# Patient Record
Sex: FEMALE | Race: WHITE | Employment: FULL TIME | URBAN - METROPOLITAN AREA
[De-identification: names, ages, dates, MRNs, and addresses within clinical notes are randomized per-mention and may not be internally consistent; named-entity substitution may affect disease eponyms.]

---

## 2017-03-09 ENCOUNTER — TELEPHONE (OUTPATIENT)
Dept: FAMILY MEDICINE CLINIC | Age: 32
End: 2017-03-09

## 2022-10-09 ENCOUNTER — HOSPITAL ENCOUNTER (EMERGENCY)
Age: 37
Discharge: HOME | End: 2022-10-09
Payer: COMMERCIAL

## 2022-10-09 VITALS
TEMPERATURE: 98.5 F | BODY MASS INDEX: 42.35 KG/M2 | RESPIRATION RATE: 17 BRPM | OXYGEN SATURATION: 100 % | SYSTOLIC BLOOD PRESSURE: 117 MMHG | HEIGHT: 63 IN | WEIGHT: 239 LBS | DIASTOLIC BLOOD PRESSURE: 77 MMHG | HEART RATE: 84 BPM

## 2022-10-09 DIAGNOSIS — R73.9 HYPERGLYCEMIA: ICD-10-CM

## 2022-10-09 DIAGNOSIS — E11.9 NEW ONSET TYPE 2 DIABETES MELLITUS (HCC): Primary | ICD-10-CM

## 2022-10-09 LAB
A/G RATIO: 1.5 (ref 1.1–2.2)
ALBUMIN SERPL-MCNC: 4.8 G/DL (ref 3.4–5)
ALP BLD-CCNC: 121 U/L (ref 40–129)
ALT SERPL-CCNC: 124 U/L (ref 10–40)
ANION GAP SERPL CALCULATED.3IONS-SCNC: 16 MMOL/L (ref 3–16)
AST SERPL-CCNC: 159 U/L (ref 15–37)
BACTERIA: ABNORMAL /HPF
BASE EXCESS VENOUS: -2.4 MMOL/L (ref -3–3)
BASOPHILS ABSOLUTE: 0.1 K/UL (ref 0–0.2)
BASOPHILS RELATIVE PERCENT: 1.1 %
BILIRUB SERPL-MCNC: 0.9 MG/DL (ref 0–1)
BILIRUBIN URINE: NEGATIVE
BLOOD, URINE: ABNORMAL
BUN BLDV-MCNC: 12 MG/DL (ref 7–20)
CALCIUM SERPL-MCNC: 9.8 MG/DL (ref 8.3–10.6)
CARBOXYHEMOGLOBIN: 1.9 % (ref 0–1.5)
CHLORIDE BLD-SCNC: 90 MMOL/L (ref 99–110)
CHP ED QC CHECK: YES
CHP ED QC CHECK: YES
CLARITY: ABNORMAL
CO2: 23 MMOL/L (ref 21–32)
COLOR: YELLOW
CREAT SERPL-MCNC: 0.8 MG/DL (ref 0.6–1.1)
EOSINOPHILS ABSOLUTE: 0.2 K/UL (ref 0–0.6)
EOSINOPHILS RELATIVE PERCENT: 3 %
EPITHELIAL CELLS, UA: ABNORMAL /HPF (ref 0–5)
GFR AFRICAN AMERICAN: >60
GFR NON-AFRICAN AMERICAN: >60
GLUCOSE BLD-MCNC: 277 MG/DL (ref 70–99)
GLUCOSE BLD-MCNC: 352 MG/DL
GLUCOSE BLD-MCNC: 352 MG/DL (ref 70–99)
GLUCOSE BLD-MCNC: 352 MG/DL (ref 70–99)
GLUCOSE BLD-MCNC: 422 MG/DL (ref 70–99)
GLUCOSE BLD-MCNC: 456 MG/DL
GLUCOSE BLD-MCNC: 456 MG/DL (ref 70–99)
GLUCOSE URINE: >=1000 MG/DL
HCG QUALITATIVE: NEGATIVE
HCO3 VENOUS: 23.4 MMOL/L (ref 23–29)
HCT VFR BLD CALC: 41.8 % (ref 36–48)
HEMOGLOBIN: 14.3 G/DL (ref 12–16)
KETONES, URINE: 40 MG/DL
LACTIC ACID: 3.4 MMOL/L (ref 0.4–2)
LEUKOCYTE ESTERASE, URINE: NEGATIVE
LYMPHOCYTES ABSOLUTE: 2.5 K/UL (ref 1–5.1)
LYMPHOCYTES RELATIVE PERCENT: 43.1 %
MCH RBC QN AUTO: 28.4 PG (ref 26–34)
MCHC RBC AUTO-ENTMCNC: 34.1 G/DL (ref 31–36)
MCV RBC AUTO: 83.2 FL (ref 80–100)
METHEMOGLOBIN VENOUS: 0.5 %
MICROSCOPIC EXAMINATION: YES
MONOCYTES ABSOLUTE: 0.3 K/UL (ref 0–1.3)
MONOCYTES RELATIVE PERCENT: 4.5 %
NEUTROPHILS ABSOLUTE: 2.8 K/UL (ref 1.7–7.7)
NEUTROPHILS RELATIVE PERCENT: 48.3 %
NITRITE, URINE: NEGATIVE
O2 SAT, VEN: 64 %
O2 THERAPY: ABNORMAL
PCO2, VEN: 44.2 MMHG (ref 40–50)
PDW BLD-RTO: 13.4 % (ref 12.4–15.4)
PERFORMED ON: ABNORMAL
PH UA: 6 (ref 5–8)
PH VENOUS: 7.34 (ref 7.35–7.45)
PLATELET # BLD: 241 K/UL (ref 135–450)
PMV BLD AUTO: 7.8 FL (ref 5–10.5)
PO2, VEN: 32.3 MMHG (ref 25–40)
POTASSIUM REFLEX MAGNESIUM: 3.6 MMOL/L (ref 3.5–5.1)
PROTEIN UA: ABNORMAL MG/DL
RBC # BLD: 5.02 M/UL (ref 4–5.2)
RBC UA: ABNORMAL /HPF (ref 0–4)
SODIUM BLD-SCNC: 129 MMOL/L (ref 136–145)
SPECIFIC GRAVITY UA: 1.02 (ref 1–1.03)
SPECIMEN STATUS: NORMAL
TCO2 CALC VENOUS: 25 MMOL/L
TOTAL PROTEIN: 8 G/DL (ref 6.4–8.2)
TROPONIN: <0.01 NG/ML
URINE REFLEX TO CULTURE: YES
URINE TYPE: ABNORMAL
UROBILINOGEN, URINE: 0.2 E.U./DL
WBC # BLD: 5.9 K/UL (ref 4–11)
WBC UA: ABNORMAL /HPF (ref 0–5)

## 2022-10-09 PROCEDURE — 84703 CHORIONIC GONADOTROPIN ASSAY: CPT

## 2022-10-09 PROCEDURE — 99284 EMERGENCY DEPT VISIT MOD MDM: CPT

## 2022-10-09 PROCEDURE — 93005 ELECTROCARDIOGRAM TRACING: CPT | Performed by: PHYSICIAN ASSISTANT

## 2022-10-09 PROCEDURE — 84484 ASSAY OF TROPONIN QUANT: CPT

## 2022-10-09 PROCEDURE — 81001 URINALYSIS AUTO W/SCOPE: CPT

## 2022-10-09 PROCEDURE — 87086 URINE CULTURE/COLONY COUNT: CPT

## 2022-10-09 PROCEDURE — 82803 BLOOD GASES ANY COMBINATION: CPT

## 2022-10-09 PROCEDURE — 2580000003 HC RX 258: Performed by: PHYSICIAN ASSISTANT

## 2022-10-09 PROCEDURE — 80053 COMPREHEN METABOLIC PANEL: CPT

## 2022-10-09 PROCEDURE — 85025 COMPLETE CBC W/AUTO DIFF WBC: CPT

## 2022-10-09 PROCEDURE — 83605 ASSAY OF LACTIC ACID: CPT

## 2022-10-09 PROCEDURE — 96376 TX/PRO/DX INJ SAME DRUG ADON: CPT

## 2022-10-09 PROCEDURE — 96374 THER/PROPH/DIAG INJ IV PUSH: CPT

## 2022-10-09 PROCEDURE — 6370000000 HC RX 637 (ALT 250 FOR IP): Performed by: PHYSICIAN ASSISTANT

## 2022-10-09 RX ORDER — SODIUM CHLORIDE, SODIUM LACTATE, POTASSIUM CHLORIDE, AND CALCIUM CHLORIDE .6; .31; .03; .02 G/100ML; G/100ML; G/100ML; G/100ML
2000 INJECTION, SOLUTION INTRAVENOUS ONCE
Status: COMPLETED | OUTPATIENT
Start: 2022-10-09 | End: 2022-10-09

## 2022-10-09 RX ORDER — 0.9 % SODIUM CHLORIDE 0.9 %
1000 INTRAVENOUS SOLUTION INTRAVENOUS ONCE
Status: COMPLETED | OUTPATIENT
Start: 2022-10-09 | End: 2022-10-09

## 2022-10-09 RX ADMIN — INSULIN HUMAN 5 UNITS: 100 INJECTION, SOLUTION PARENTERAL at 17:29

## 2022-10-09 RX ADMIN — SODIUM CHLORIDE, POTASSIUM CHLORIDE, SODIUM LACTATE AND CALCIUM CHLORIDE 2000 ML: 600; 310; 30; 20 INJECTION, SOLUTION INTRAVENOUS at 15:36

## 2022-10-09 RX ADMIN — SODIUM CHLORIDE 1000 ML: 9 INJECTION, SOLUTION INTRAVENOUS at 18:37

## 2022-10-09 RX ADMIN — INSULIN HUMAN 5 UNITS: 100 INJECTION, SOLUTION PARENTERAL at 18:36

## 2022-10-09 ASSESSMENT — ENCOUNTER SYMPTOMS
EYE DISCHARGE: 0
EYE PAIN: 0
PHOTOPHOBIA: 0
EYE ITCHING: 0
EYE REDNESS: 0

## 2022-10-09 ASSESSMENT — PAIN - FUNCTIONAL ASSESSMENT: PAIN_FUNCTIONAL_ASSESSMENT: NONE - DENIES PAIN

## 2022-10-09 NOTE — ED NOTES
Patient in bed resting upon room entry for hourly rounding. Patient repositioned for comfort: pulled up in bed, pillows readjusted around patient, and blankets straightened. No acute signs or symptoms of distress noted at this time. Respiratory status stable and VS WDL. Patient denies any needs, concerns, or complaints at this time. Call light within reach.      Luther Wilson RN  10/09/22 7089

## 2022-10-09 NOTE — ED PROVIDER NOTES
[facility]  ED  EMERGENCY DEPARTMENT ENCOUNTER        Pt Name: Rush Ballard  MRN: [id number]  [place] 1985  Date of evaluation: 10/9/2022  Provider: Mike Quesada PA-C  PCP: No primary care provider on file. Note Started: 3:48 PM EDT       CRYS. I have evaluated this patient. My supervising physician was available for consultation. CHIEF COMPLAINT       Chief Complaint   Patient presents with    Hyperglycemia     Pt to ED with c/o hyperglycemia in the 400s. Pt is not diabetic. Pt was seen at urgent care for symptoms including muscle aches, fatigue, blurry vision. Pt sent here from urgent care. HISTORY OF PRESENT ILLNESS   (Location, Timing/Onset, Context/Setting, Quality, Duration, Modifying Factors, Severity, Associated Signs and Symptoms)  Note limiting factors. Chief Complaint: Hyperglycemia, symptoms of diabetes    Rush Ballard is a 44 y.o. female who presents complaining planing of blood glucose of 400 today. Patient reports she has had polyuria, polydipsia for about a week, had blurred vision for 4 days since. She had her blood sugar checked out a little clinic today, sugar was over 400, directed to the ER. Denies prior history of diabetes, gestational diabetes, known pregnancy. Blood pressure elevated here, patient states anxious about elevated blood sugar, patient does not take any medication for hypertension or diabetes, daily medications at all. Nursing Notes were all reviewed and agreed with or any disagreements were addressed in the HPI. REVIEW OF SYSTEMS    (2-9 systems for level 4, 10 or more for level 5)     Review of Systems   Eyes:  Positive for visual disturbance. Negative for photophobia, pain, discharge, redness and itching. Neurological:  Positive for numbness (left 4th/5th digits since this am). Negative for dizziness, weakness and headaches. All other systems reviewed and are negative.     Positives and Pertinent negatives as per HPI. Except as noted above in the ROS, all other systems were reviewed and negative. PAST MEDICAL HISTORY   History reviewed. No pertinent past medical history. SURGICAL HISTORY     Past Surgical History:   Procedure Laterality Date     SECTION      X 2         CURRENTMEDICATIONS       Previous Medications    JUNEL FE 1/20 1-20 MG-MCG PER TABLET    Take 1 tablet by mouth daily         ALLERGIES     Patient has no known allergies. FAMILYHISTORY       Family History   Problem Relation Age of Onset    Cancer Mother         breast    Cancer Maternal Uncle         brain    Cancer Maternal Grandmother         lymphoma          SOCIAL HISTORY       Social History     Tobacco Use    Smoking status: Former     Types: Cigarettes     Quit date: 2013     Years since quittin.7    Smokeless tobacco: Never   Substance Use Topics    Alcohol use: Yes     Comment: rare    Drug use: No       SCREENINGS    Lg Coma Scale  Eye Opening: Spontaneous  Best Verbal Response: Oriented  Best Motor Response: Obeys commands  Jones Coma Scale Score: 15        PHYSICAL EXAM    (up to 7 for level 4, 8 or more for level 5)     ED Triage Vitals [10/09/22 1453]   BP Temp Temp Source Heart Rate Resp SpO2 Height Weight   (!) 169/113 98.5 °F (36.9 °C) Oral 98 18 98 % 5' 3\" (1.6 m) 239 lb (108.4 kg)       Physical Exam  Vitals and nursing note reviewed. Constitutional:       General: She is not in acute distress. Appearance: She is not ill-appearing or toxic-appearing. HENT:      Head: Normocephalic and atraumatic. Right Ear: External ear normal.      Left Ear: External ear normal.      Nose: Nose normal.      Mouth/Throat:      Mouth: Mucous membranes are moist.      Pharynx: Oropharynx is clear. Eyes:      Extraocular Movements: Extraocular movements intact. Conjunctiva/sclera: Conjunctivae normal.      Pupils: Pupils are equal, round, and reactive to light.    Cardiovascular: Rate and Rhythm: Normal rate and regular rhythm. Pulses: Normal pulses. Heart sounds: Normal heart sounds. Pulmonary:      Effort: Pulmonary effort is normal. No respiratory distress. Breath sounds: Normal breath sounds. Abdominal:      General: Abdomen is flat. Bowel sounds are normal. There is no distension. Palpations: Abdomen is soft. Tenderness: There is no abdominal tenderness. There is no guarding or rebound. Musculoskeletal:         General: Normal range of motion. Cervical back: Normal range of motion and neck supple. Skin:     General: Skin is warm and dry. Capillary Refill: Capillary refill takes less than 2 seconds. Neurological:      General: No focal deficit present. Mental Status: She is alert and oriented to person, place, and time. Cranial Nerves: No cranial nerve deficit. Sensory: Sensory deficit present. Motor: No weakness (decreased light touch in ulnar dermatome on left. no weakness).       Coordination: Coordination normal.      Gait: Gait normal.   Psychiatric:         Mood and Affect: Mood normal.         Behavior: Behavior normal.       DIAGNOSTIC RESULTS   LABS:    Labs Reviewed   COMPREHENSIVE METABOLIC PANEL W/ REFLEX TO MG FOR LOW K - Abnormal; Notable for the following components:       Result Value    Sodium 129 (*)     Chloride 90 (*)     Glucose 422 (*)      (*)      (*)     All other components within normal limits   BLOOD GAS, VENOUS - Abnormal; Notable for the following components:    pH, Job 7.342 (*)     Carboxyhemoglobin 1.9 (*)     All other components within normal limits   LACTIC ACID - Abnormal; Notable for the following components:    Lactic Acid 3.4 (*)     All other components within normal limits   URINALYSIS WITH REFLEX TO CULTURE - Abnormal; Notable for the following components:    Clarity, UA SL CLOUDY (*)     Glucose, Ur >=1000 (*)     Ketones, Urine 40 (*)     Blood, Urine LARGE (*) Protein, UA TRACE (*)     All other components within normal limits   MICROSCOPIC URINALYSIS - Abnormal; Notable for the following components:    WBC, UA 10-20 (*)     RBC, UA 5-10 (*)     Epithelial Cells, UA 21-50 (*)     Bacteria, UA 3+ (*)     All other components within normal limits   POCT GLUCOSE - Abnormal; Notable for the following components:    POC Glucose 456 (*)     All other components within normal limits   POCT GLUCOSE - Abnormal; Notable for the following components:    POC Glucose 352 (*)     All other components within normal limits   POCT GLUCOSE - Abnormal; Notable for the following components:    POC Glucose 352 (*)     All other components within normal limits   POCT GLUCOSE - Abnormal; Notable for the following components:    POC Glucose 277 (*)     All other components within normal limits   POCT GLUCOSE - Normal   POCT GLUCOSE - Normal   CULTURE, URINE   CBC WITH AUTO DIFFERENTIAL   SAMPLE POSSIBLE BLOOD BANK TESTING   HCG, SERUM, QUALITATIVE   TROPONIN       When ordered only abnormal lab results are displayed. All other labs were within normal range or not returned as of this dictation. EKG: When ordered, EKG's are interpreted by the Emergency Department Physician in the absence of a cardiologist.  Please see their note for interpretation of EKG. RADIOLOGY:   Non-plain film images such as CT, Ultrasound and MRI are read by the radiologist. Plain radiographic images are visualized and preliminarily interpreted by the ED Provider with the below findings:        Interpretation per the Radiologist below, if available at the time of this note:    No orders to display     No results found.         PROCEDURES   Unless otherwise noted below, none     Procedures    CRITICAL CARE TIME       CONSULTS:  None      EMERGENCY DEPARTMENT COURSE and DIFFERENTIAL DIAGNOSIS/MDM:   Vitals:    Vitals:    10/09/22 1737 10/09/22 1808 10/09/22 1837 10/09/22 1907   BP: 130/75 121/76 116/82 117/77   Pulse: 85 84 82 84   Resp: 14 14 17 17   Temp:       TempSrc:       SpO2: 99% 99% 99% 100%   Weight:       Height:           Patient was given the following medications:  Medications   lactated ringers bolus (0 mLs IntraVENous Stopped 10/9/22 1720)   insulin regular (HUMULIN R;NOVOLIN R) injection 5 Units (5 Units IntraVENous Given 10/9/22 1729)   0.9 % sodium chloride bolus (0 mLs IntraVENous Stopped 10/9/22 1937)   insulin regular (HUMULIN R;NOVOLIN R) injection 5 Units (5 Units IntraVENous Given 10/9/22 1836)         Is this patient to be included in the SEP-1 Core Measure due to severe sepsis or septic shock? No   Exclusion criteria - the patient is NOT to be included for SEP-1 Core Measure due to: Infection is not suspected    Briefly, patient with new onset diabetes, hyperglycemia without DKA or dehydration. She has pseudohyponatremia natremia, given 3 L of fluid and small amount of insulin here and blood glucose is trending down. Instructed to watch carbohydrate intake, take metformin as prescribed, get PCP and follow-up, return for any new or worsening symptoms    FINAL IMPRESSION      1. New onset type 2 diabetes mellitus ([facility] [address]    2. Hyperglycemia          DISPOSITION/PLAN   DISPOSITION Decision To Discharge 10/09/2022 07:35:49 PM      PATIENT REFERRED TO:  Alexandrea Shrestha MD  [address] [place] [address]  [phone]    In 2 days  Primary care follow-up. Return for any new or worsening symptoms. DISCHARGE MEDICATIONS:  New Prescriptions    METFORMIN (GLUCOPHAGE) 500 MG TABLET    Take 1 tablet by mouth 2 times daily (with meals)       DISCONTINUED MEDICATIONS:  Discontinued Medications    No medications on file              (Please note that portions of this note were completed with a voice recognition program.  Efforts were made to edit the dictations but occasionally words are mis-transcribed. )    Brennen Bae PA-C (electronically signed)            Brennen Bae SIERRA  10/09/22 1940

## 2022-10-09 NOTE — ED NOTES
Patient in bed resting upon room entry for hourly rounding. Patient repositioned for comfort: pulled up in bed, pillows readjusted around patient, and blankets straightened. No acute signs or symptoms of distress noted at this time. Respiratory status stable and VS WDL. Patient denies any needs, concerns, or complaints at this time. Call light within reach.      Fani Spann RN  10/09/22 1827

## 2022-10-09 NOTE — ED NOTES
Discharge instructions explained by ED provider. Patient verbalized understanding and denies any other concerns or complaints at this time. Patient vital signs stable and no acute signs or symptoms of distress noted at discharge. Patient deemed clinically stable. Patient d/c home with family.      Karina Hackett RN  10/09/22 7219

## 2022-10-09 NOTE — ED NOTES
Patient in bed resting upon room entry for hourly rounding. Patient repositioned for comfort: pulled up in bed, pillows readjusted around patient, and blankets straightened. No acute signs or symptoms of distress noted at this time. Respiratory status stable and VS WDL. Patient denies any needs, concerns, or complaints at this time. Call light within reach.      Gigi Arnold RN  10/09/22 5381

## 2022-10-09 NOTE — ED PROVIDER NOTES
I did not personally evaluate this patient but I was asked to review the EKG. EKG  The Ekg interpreted by myself in the emergency department in the absence of a cardiologist.  normal sinus rhythm with a rate of 81  Axis is   Normal  QTc is  within an acceptable range  Intervals and Durations are unremarkable. No specific ST-T wave changes appreciated. Nonspecific changes 2 3 aVF, unchanged with repeat EKG done 30 minutes apart  No evidence of acute ischemia.    No significant change from prior EKG dated June 7, 2012     Hudson Oviedo MD  10/09/22 1288

## 2022-10-10 LAB
EKG ATRIAL RATE: 84 BPM
EKG DIAGNOSIS: NORMAL
EKG P AXIS: 57 DEGREES
EKG P-R INTERVAL: 146 MS
EKG Q-T INTERVAL: 372 MS
EKG QRS DURATION: 78 MS
EKG QTC CALCULATION (BAZETT): 439 MS
EKG R AXIS: 14 DEGREES
EKG T AXIS: 46 DEGREES
EKG VENTRICULAR RATE: 84 BPM
URINE CULTURE, ROUTINE: NORMAL

## 2022-10-13 ENCOUNTER — HOSPITAL ENCOUNTER (OUTPATIENT)
Age: 37
Discharge: HOME | End: 2022-10-13
Payer: COMMERCIAL

## 2022-10-13 ENCOUNTER — OFFICE VISIT (OUTPATIENT)
Dept: PRIMARY CARE CLINIC | Age: 37
End: 2022-10-13
Payer: COMMERCIAL

## 2022-10-13 VITALS
HEART RATE: 99 BPM | TEMPERATURE: 97.2 F | WEIGHT: 236.2 LBS | BODY MASS INDEX: 41.85 KG/M2 | OXYGEN SATURATION: 98 % | HEIGHT: 63 IN

## 2022-10-13 DIAGNOSIS — E11.9 TYPE 2 DIABETES MELLITUS WITHOUT COMPLICATION, WITHOUT LONG-TERM CURRENT USE OF INSULIN (HCC): Primary | ICD-10-CM

## 2022-10-13 DIAGNOSIS — N92.0 MENORRHAGIA WITH REGULAR CYCLE: ICD-10-CM

## 2022-10-13 DIAGNOSIS — R06.83 SNORING: ICD-10-CM

## 2022-10-13 DIAGNOSIS — Z11.4 SCREENING FOR HIV WITHOUT PRESENCE OF RISK FACTORS: ICD-10-CM

## 2022-10-13 DIAGNOSIS — R31.9 HEMATURIA, UNSPECIFIED TYPE: ICD-10-CM

## 2022-10-13 DIAGNOSIS — Z00.00 HEALTHCARE MAINTENANCE: ICD-10-CM

## 2022-10-13 DIAGNOSIS — Z11.59 NEED FOR HEPATITIS C SCREENING TEST: ICD-10-CM

## 2022-10-13 DIAGNOSIS — E11.9 TYPE 2 DIABETES MELLITUS WITHOUT COMPLICATION, WITHOUT LONG-TERM CURRENT USE OF INSULIN (HCC): ICD-10-CM

## 2022-10-13 DIAGNOSIS — R74.01 TRANSAMINITIS: ICD-10-CM

## 2022-10-13 LAB
CHOLESTEROL, TOTAL: 214 MG/DL (ref 0–199)
CREATININE URINE: 166.6 MG/DL (ref 28–259)
HDLC SERPL-MCNC: 24 MG/DL (ref 40–60)
HEPATITIS C ANTIBODY INTERPRETATION: NORMAL
LDL CHOLESTEROL CALCULATED: ABNORMAL MG/DL
LDL CHOLESTEROL DIRECT: 99 MG/DL
MICROALBUMIN UR-MCNC: 5 MG/DL
MICROALBUMIN/CREAT UR-RTO: 30 MG/G (ref 0–30)
TRIGL SERPL-MCNC: 597 MG/DL (ref 0–150)
VLDLC SERPL CALC-MCNC: ABNORMAL MG/DL

## 2022-10-13 PROCEDURE — 99204 OFFICE O/P NEW MOD 45 MIN: CPT | Performed by: STUDENT IN AN ORGANIZED HEALTH CARE EDUCATION/TRAINING PROGRAM

## 2022-10-13 PROCEDURE — 86702 HIV-2 ANTIBODY: CPT

## 2022-10-13 PROCEDURE — 36415 COLL VENOUS BLD VENIPUNCTURE: CPT

## 2022-10-13 PROCEDURE — 82043 UR ALBUMIN QUANTITATIVE: CPT

## 2022-10-13 PROCEDURE — 83036 HEMOGLOBIN GLYCOSYLATED A1C: CPT

## 2022-10-13 PROCEDURE — 87390 HIV-1 AG IA: CPT

## 2022-10-13 PROCEDURE — 86803 HEPATITIS C AB TEST: CPT

## 2022-10-13 PROCEDURE — 82570 ASSAY OF URINE CREATININE: CPT

## 2022-10-13 PROCEDURE — 83721 ASSAY OF BLOOD LIPOPROTEIN: CPT

## 2022-10-13 PROCEDURE — 80061 LIPID PANEL: CPT

## 2022-10-13 PROCEDURE — 86787 VARICELLA-ZOSTER ANTIBODY: CPT

## 2022-10-13 PROCEDURE — 86701 HIV-1ANTIBODY: CPT

## 2022-10-13 RX ORDER — METFORMIN HYDROCHLORIDE 500 MG/1
1000 TABLET, EXTENDED RELEASE ORAL
Qty: 180 TABLET | Refills: 0 | Status: SHIPPED | OUTPATIENT
Start: 2022-10-13 | End: 2023-01-11

## 2022-10-13 SDOH — ECONOMIC STABILITY: FOOD INSECURITY: WITHIN THE PAST 12 MONTHS, THE FOOD YOU BOUGHT JUST DIDN'T LAST AND YOU DIDN'T HAVE MONEY TO GET MORE.: NEVER TRUE

## 2022-10-13 SDOH — ECONOMIC STABILITY: FOOD INSECURITY: WITHIN THE PAST 12 MONTHS, YOU WORRIED THAT YOUR FOOD WOULD RUN OUT BEFORE YOU GOT MONEY TO BUY MORE.: NEVER TRUE

## 2022-10-13 ASSESSMENT — PATIENT HEALTH QUESTIONNAIRE - PHQ9
3. TROUBLE FALLING OR STAYING ASLEEP: 1
SUM OF ALL RESPONSES TO PHQ QUESTIONS 1-9: 10
SUM OF ALL RESPONSES TO PHQ QUESTIONS 1-9: 10
9. THOUGHTS THAT YOU WOULD BE BETTER OFF DEAD, OR OF HURTING YOURSELF: 0
SUM OF ALL RESPONSES TO PHQ9 QUESTIONS 1 & 2: 2
5. POOR APPETITE OR OVEREATING: 2
6. FEELING BAD ABOUT YOURSELF - OR THAT YOU ARE A FAILURE OR HAVE LET YOURSELF OR YOUR FAMILY DOWN: 1
1. LITTLE INTEREST OR PLEASURE IN DOING THINGS: 1
7. TROUBLE CONCENTRATING ON THINGS, SUCH AS READING THE NEWSPAPER OR WATCHING TELEVISION: 3
2. FEELING DOWN, DEPRESSED OR HOPELESS: 1
10. IF YOU CHECKED OFF ANY PROBLEMS, HOW DIFFICULT HAVE THESE PROBLEMS MADE IT FOR YOU TO DO YOUR WORK, TAKE CARE OF THINGS AT HOME, OR GET ALONG WITH OTHER PEOPLE: 1
SUM OF ALL RESPONSES TO PHQ QUESTIONS 1-9: 10
SUM OF ALL RESPONSES TO PHQ QUESTIONS 1-9: 10
4. FEELING TIRED OR HAVING LITTLE ENERGY: 1

## 2022-10-13 ASSESSMENT — ENCOUNTER SYMPTOMS
DIARRHEA: 0
SHORTNESS OF BREATH: 0
VOMITING: 0
NAUSEA: 0
EYE PAIN: 0

## 2022-10-13 ASSESSMENT — ANXIETY QUESTIONNAIRES
5. BEING SO RESTLESS THAT IT IS HARD TO SIT STILL: 1
GAD7 TOTAL SCORE: 8
7. FEELING AFRAID AS IF SOMETHING AWFUL MIGHT HAPPEN: 0
1. FEELING NERVOUS, ANXIOUS, OR ON EDGE: 1
4. TROUBLE RELAXING: 3
3. WORRYING TOO MUCH ABOUT DIFFERENT THINGS: 1
6. BECOMING EASILY ANNOYED OR IRRITABLE: 1
2. NOT BEING ABLE TO STOP OR CONTROL WORRYING: 1
IF YOU CHECKED OFF ANY PROBLEMS ON THIS QUESTIONNAIRE, HOW DIFFICULT HAVE THESE PROBLEMS MADE IT FOR YOU TO DO YOUR WORK, TAKE CARE OF THINGS AT HOME, OR GET ALONG WITH OTHER PEOPLE: SOMEWHAT DIFFICULT

## 2022-10-13 ASSESSMENT — SOCIAL DETERMINANTS OF HEALTH (SDOH): HOW HARD IS IT FOR YOU TO PAY FOR THE VERY BASICS LIKE FOOD, HOUSING, MEDICAL CARE, AND HEATING?: NOT HARD AT ALL

## 2022-10-13 NOTE — PROGRESS NOTES
[address],  [place] [address]        Phone: [phone]    The following is written by a medical student, please see below for resident/attending attestation and plan. Name:  Rush Ballard  :    1985    Consultants:   Patient Care Team:  Maribell Echeverria DO as PCP - General (Family Medicine)    Chief Complaint:     Rush Ballard is a 44 y.o.  female who presents today for an established patient care visit with Personalized Prevention Plan Services as noted below. HPI:     Clay Ritchie 44 y.o. female has Chronic fatigue; Social anxiety disorder; Adult acne; Type 2 diabetes mellitus without complication, without long-term current use of insulin ([facility] [address] and Transaminitis on their problem list.  She presents due to new diagnosis of diabetes, ed visit. New diabetes     She went to [place] [id number] last week and began to have some blurry vision that also progressed to numbness on the left fourth and fifth digits. She also had muscle aches and polyuria and, when tested, had a blood glucose of 442. She went to [facility] and blood glucose was similar. Given insulin and fluid, and was prescribed metformin 500 mg BID. She began taking metformin and had diarrhea - on Tuesday she had six bowel movements. Her diarrhea is improving. Her nausea is resolved. Her hand numbness and tingling has resolved. She states that she is making lifestyle changes including having a more consistent eating pattern. Positive polydipsia, polyuria, and polyphagia prior to ED encounter, all of which have resolved. Anxiety and depression    Since April of this year she has not been as active as before. Her sleep has worsened and now she only gets 3-4 hours per night.   She has not tried anything for sleep in the past. She says she does not have good daily routines. She notes fewer interests and less interest in her hobbies. She notes feeling more sad than usual, has guilt over her marital situation and whether she is a good mother to her children. She notes trouble concentrating at work. Parents live near her but she has trouble asking for help. She denies having many friends. She wants to avoid pharmacotherapy at this time and would rather seek therapy. She denies symptoms of alphonso. VIKTORIYA 7 SCORE 10/13/2022   VIKTORIYA-7 Total Score 8     Interpretation of VIKTORIYA-7 score: 5-9 = mild anxiety, 10-14 = moderate anxiety, 15+ = severe anxiety. Recommend referral to behavioral health for scores 10 or greater. Snoring    Per the patient, people who have watched her sleep have noticed that she stops breathing in the middle of night. She does not wake up with a headache. Heavy, irregular menses    She has not gone to the gynecologist in many years. She has had irregular menstrual cycles since 2008 and been on and off birth control, and is not on any right now. Her irregularity is such that she may go more than a month without having a menstrual cycle. When she does, she has heavy bleeding with no additional pain. She passes clots. She is not currently sexually active and has not been so in two years. She denies problems with conceiving children. She denies other gynecologic concerns. Her last pap smear was in 2018. She was found having CIN1. Elevated liver enzymes    She is asymptomatic    Blood in urine    Asymptomatic, no flank pain. She was on her menstrual cycle at the time the UA was taken. Medical history    No other previous medical history. Takes occasional multivitamins. No tobacco, no alcohol, taken marijuana gummies but has not had any in a year. Family history: Mother had breast cancer at the age of 36. Maternal grandmother had lymphoma. Has one sister with no relevant history.   Has two children in good health. Patient Active Problem List   Diagnosis    Chronic fatigue    Social anxiety disorder    Adult acne    Type 2 diabetes mellitus without complication, without long-term current use of insulin ([facility] [address]    Transaminitis         Past Medical History:    No past medical history on file. Past Surgical History:  Past Surgical History:   Procedure Laterality Date     SECTION      X 2       Home Meds:  Prior to Visit Medications    Medication Sig Taking? Authorizing Provider   Semaglutide,0.25 or 0.5MG/DOS, 2 MG/1.5ML SOPN Inject 0.25 mg into the skin once a week Yes Magdelene K May, DO   metFORMIN (GLUCOPHAGE-XR) 500 MG extended release tablet Take 2 tablets by mouth daily (with breakfast) Yes Magdelene K May, DO       Allergies:    Patient has no known allergies. Family History:       Problem Relation Age of Onset    Cancer Mother         breast    Cancer Maternal Uncle         brain    Cancer Maternal Grandmother         lymphoma         Health Maintenance Completed:  Health Maintenance   Topic Date Due    Varicella vaccine (1 of 2 - 2-dose childhood series) Never done    Pneumococcal 0-64 years Vaccine (1 - PCV) Never done    HIV screen  Never done    DTaP/Tdap/Td vaccine (1 - Tdap) Never done    Cervical cancer screen  Never done    Diabetes screen  Never done    COVID-19 Vaccine (2 - Booster for Jessica series) 2021    Flu vaccine (1) Never done    Depression Monitoring  10/13/2023    Hepatitis C screen  Completed    Hepatitis A vaccine  Aged Out    Hib vaccine  Aged Out    Meningococcal (ACWY) vaccine  Aged [employer] History   Administered Date(s) Administered    COVID-19, J&J, (age 18y+), IM, 0.5 mL 2021         Review of Systems:  Review of Systems   Constitutional:  Negative for chills and fever. HENT:  Negative for hearing loss. Eyes:  Positive for visual disturbance (blurry vision- improving). Negative for pain.    Respiratory:  Negative for shortness of breath. Cardiovascular:  Negative for chest pain and palpitations. Gastrointestinal:  Negative for diarrhea, nausea and vomiting. Endocrine: Positive for polydipsia, polyphagia and polyuria. Neurological:  Negative for dizziness and headaches. Psychiatric/Behavioral:  Negative for agitation. Physical Exam:   Vitals:    10/13/22 0931   Pulse: 99   Temp: 97.2 °F (36.2 °C)   SpO2: 98%   Weight: 236 lb 3.2 oz (107.1 kg)   Height: 5' 3\" (1.6 m)     Body mass index is 41.84 kg/m². Wt Readings from Last 3 Encounters:   10/13/22 236 lb 3.2 oz (107.1 kg)   10/09/22 239 lb (108.4 kg)   07/22/16 201 lb 9.6 oz (91.4 kg)       BP Readings from Last 3 Encounters:   10/09/22 117/77   07/22/16 106/70   05/12/16 108/68       Physical Exam  Constitutional:       General: She is not in acute distress. Appearance: Normal appearance. HENT:      Nose: Nose normal.   Eyes:      General: No scleral icterus. Extraocular Movements: Extraocular movements intact. Conjunctiva/sclera: Conjunctivae normal.   Cardiovascular:      Rate and Rhythm: Normal rate and regular rhythm. Pulses: Normal pulses. Heart sounds: Normal heart sounds. Pulmonary:      Effort: Pulmonary effort is normal.      Breath sounds: Normal breath sounds. Musculoskeletal:         General: No signs of injury. Neurological:      General: No focal deficit present. Mental Status: She is alert and oriented to person, place, and time. Cranial Nerves: Cranial nerves 2-12 are intact. Deep Tendon Reflexes:      Reflex Scores:       Patellar reflexes are 2+ on the right side and 2+ on the left side.   Psychiatric:         Mood and Affect: Mood normal.         Behavior: Behavior normal.      Comments: Tearfulness upon discussing mood        CN II-XII normal  Heart and lungs normal  Hands sensation normal  Feet exam normal  Patellar reflexes normal  Tearful during some points in interview    Lab Review: Admission on 10/09/2022, Discharged on 10/09/2022   Component Date Value    WBC 10/09/2022 5.9     RBC 10/09/2022 5.02     Hemoglobin 10/09/2022 14.3     Hematocrit 10/09/2022 41.8     MCV 10/09/2022 83.2     MCH 10/09/2022 28.4     MCHC 10/09/2022 34.1     RDW 10/09/2022 13.4     Platelets 59/41/2611 241     MPV 10/09/2022 7.8     Neutrophils % 10/09/2022 48. 3     Lymphocytes % 10/09/2022 43.1     Monocytes % 10/09/2022 4.5     Eosinophils % 10/09/2022 3.0     Basophils % 10/09/2022 1.1     Neutrophils Absolute 10/09/2022 2.8     Lymphocytes Absolute 10/09/2022 2.5     Monocytes Absolute 10/09/2022 0.3     Eosinophils Absolute 10/09/2022 0.2     Basophils Absolute 10/09/2022 0.1     Sodium 10/09/2022 129 (A)     Potassium reflex Magnesi* 10/09/2022 3.6     Chloride 10/09/2022 90 (A)     CO2 10/09/2022 23     Anion Gap 10/09/2022 16     Glucose 10/09/2022 422 (A)     BUN 10/09/2022 12     Creatinine 10/09/2022 0.8     GFR Non- 10/09/2022 >60     GFR  10/09/2022 >60     Calcium 10/09/2022 9.8     Total Protein 10/09/2022 8.0     Albumin 10/09/2022 4.8     Albumin/Globulin Ratio 10/09/2022 1.5     Total Bilirubin 10/09/2022 0.9     Alkaline Phosphatase 10/09/2022 121     ALT 10/09/2022 124 (A)     AST 10/09/2022 159 (A)     pH, Job 10/09/2022 7.342 (A)     pCO2, Job 10/09/2022 44.2     pO2, Job 10/09/2022 32.3     HCO3, Venous 10/09/2022 23.4     Base Excess, Job 10/09/2022 -2.4     O2 Sat, Job 10/09/2022 64     Carboxyhemoglobin 10/09/2022 1.9 (A)     MetHgb, Job 10/09/2022 0.5     TC02 (Calc), Job 10/09/2022 25     O2 Therapy 10/09/2022 Unknown     Lactic Acid 10/09/2022 3.4 (A)     Color, UA 10/09/2022 Yellow     Clarity, UA 10/09/2022 SL CLOUDY (A)     Glucose, Ur 10/09/2022 >=1000 (A)     Bilirubin Urine 10/09/2022 Negative     Ketones, Urine 10/09/2022 40 (A)     Specific [place], UA 10/09/2022 1.020     Blood, Urine 10/09/2022 LARGE (A)     pH, UA 10/09/2022 6.0     Protein, UA 10/09/2022 TRACE (A)     Urobilinogen, Urine 10/09/2022 0.2     Nitrite, Urine 10/09/2022 Negative     Leukocyte Esterase, Urine 10/09/2022 Negative     Microscopic Examination 10/09/2022 YES     Urine Type 10/09/2022 NotGiven     Urine Reflex to Culture 10/09/2022 Yes     Glucose 10/09/2022 456     QC OK? 10/09/2022 yes     POC Glucose 10/09/2022 456 (A)     Performed on 10/09/2022 ACCU-CHEK     Specimen Status 10/09/2022 RAMAN     WBC, UA 10/09/2022 10-20 (A)     RBC, UA 10/09/2022 5-10 (A)     Epithelial Cells, UA 10/09/2022 21-50 (A)     Bacteria, UA 10/09/2022 3+ (A)     Ventricular Rate 10/09/2022 84     Atrial Rate 10/09/2022 84     P-R Interval 10/09/2022 146     QRS Duration 10/09/2022 78     Q-T Interval 10/09/2022 372     QTc Calculation (Bazett) 10/09/2022 439     P Axis 10/09/2022 57     R Beaver 10/09/2022 14     T Axis 10/09/2022 46     Diagnosis 10/09/2022 Normal sinus rhythmEarly repolarizationNormal ECGWhen compared with ECG of 07-JUN-2012 16:23,No significant change was foundConfirmed by Domi James ([id number]) on 10/10/2022 8:21:48 AM     hCG Qual 10/09/2022 Negative     Urine Culture, Routine 10/09/2022 <50,000 CFU/ml mixed skin/urogenital krista. No further workup     Troponin 10/09/2022 <0.01     Glucose 10/09/2022 352     QC OK? 10/09/2022 yes     POC Glucose 10/09/2022 352 (A)     Performed on 10/09/2022 ACCU-CHEK     POC Glucose 10/09/2022 352 (A)     Performed on 10/09/2022 ACCU-CHEK     POC Glucose 10/09/2022 277 (A)     Performed on 10/09/2022 ACCU-CHEK           Assessment/Plan:    New Diabetes Mellitus 2    Based on her recent glucose readings of reaching over 400, she meets the criteria for diagnosing type 2 diabetes. Her GI symptoms are likely associated with the metformin and are improving.    -increase metformin to 2000/day instead of 1000/day  -Will consider adding semaglutide contingent on her insurance.   If not, will consider adding tirzepatide  -Will start statin, pending lipid panel and will start ACE inhibitor pending microalbumin/cr  -Will obtain A1C  -The patient will return in one month or sooner with any problems    Anxiety and depression    -She meets the DSM 5 criteria for Major Depressive Disorder: for at least two weeks, she has had intense feelings of sadness with sleep disturbances, loss of interests, feelings of guilt, lack of energy, appetite changes, and concentration issues    -pharmacotherapy was offered, but patient would prefer to seek therapy. Patient has been provided sources for outpatient therapy  -Patient can return with any problems. Snoring    Ddx includes onset of sleep apnea with risk factors for sleep apnea including obesity. Did not explore further this visit. -Constantia sleepiness scale will be done next available visit. Contingent on those results or patient complaints, can conduct sleep study. Heavy, irregular menses    Ddxs include anovulatory cycles or leiomyomata    -Patient will make an appointment with her gynecologist to discuss her gynecologic concerns. Elevated liver enzymes    The patient is currently asymptomatic, but with an AST and ALT of 159 and 124, respectively, it raises concerns for non alcoholic fatty liver disease. Normal ALP and bilirubin make acute processes less likely. Also on ddx is stress response.    -patient will obtain liver ultrasound  -patient will obtain complete metabolic panel to ascertain liver status post hospitalization      Blood in urine    Asymptomatic, no flank pain. She was on her menstrual cycle at the time the UA was taken. Blood in urine was likely vaginal in origin but cannot definitively rule out bladder origin    -The patient is not currently on her menstrual cycle. Will redo UA.       Health maintenance    -Lipid panel  -Varicella vaccination  -HCV screen      Health Maintenance Due:  Health Maintenance Due   Topic Date Due    Varicella vaccine (1 of 2 - 2-dose childhood series) Never done Pneumococcal 0-64 years Vaccine (1 - PCV) Never done    HIV screen  Never done    DTaP/Tdap/Td vaccine (1 - Tdap) Never done    Cervical cancer screen  Never done    Diabetes screen  Never done    COVID-19 Vaccine (2 - Booster for Jessica series) 05/29/2021    Flu vaccine (1) Never done      RTC:  Return in about 1 month (around 11/13/2022). RESIDENT/ATTENDING ATTESTATION:    After medical student evaluation and exam, I independently gathered patients history, independently did a physical, and agree with A/P as written in medical student's note (other than clarified below). Please see below for additional information documented by the resident/attending including the A/P. Assessment/Plan:  Paramjit Ritchie 44 y.o. female has Chronic fatigue; Social anxiety disorder;  Adult acne; Type 2 diabetes mellitus without complication, without long-term current use of insulin ([facility] [address] and Transaminitis on their problem list.   Problem List          Endocrine    Type 2 diabetes mellitus without complication, without long-term current use of insulin ([address] - Primary      Uncontrolled, changes made today: increase metgormin dose and start semaglutide  - Semaglutide has the added benefit of weight loss, consider alternative if cost prohibitive   - f/u 1 mo to discuss lifestyle changes          Relevant Medications    Semaglutide,0.25 or 0.5MG/DOS, 2 MG/1.5ML SOPN    metFORMIN (GLUCOPHAGE-XR) 500 MG extended release tablet    Other Relevant Orders    MICROALBUMIN / CREATININE URINE RATIO (Completed)    Hemoglobin A1C       Other    Transaminitis      Unclear control, recommend further work up   - May be acute reaction due to hyperglycemia vs NAFLD  - F/u 1 mo to review US         Relevant Orders    US LIVER      Snoring  - Will discuss further at follow up 1 mo    Menorrhagia with regular cycle  - Will discuss further at follow up 1 mo  - Consider contraceptive      Hematuria, unspecified type  Patient was menstruating for last urinalysis, hematuria unlikely, likely contamination  - Urinalysis with Microscopic    Screening for HIV without presence of risk factors  - HIV Screen; Future    Need for hepatitis C screening test  - Hepatitis C Antibody; Future    Healthcare maintenance  - Lipid Panel; Future  - VARICELLA ZOSTER ANTIBODY, IGG; Future      EMR Dragon/transcription disclaimer:  Much of this encounter note is electronic transcription/translation of spoken language to printed texts. The electronic translation of spoken language may be erroneous, or at times, nonsensical words or phrases may be inadvertently transcribed.   Although I have reviewed the note for such errors, some may still exist.

## 2022-10-14 LAB
ESTIMATED AVERAGE GLUCOSE: 226 MG/DL
HBA1C MFR BLD: 9.5 %
HIV AG/AB: NORMAL
HIV ANTIGEN: NORMAL
HIV-1 ANTIBODY: NORMAL
HIV-2 AB: NORMAL
VARICELLA-ZOSTER VIRUS AB, IGG: NORMAL

## 2022-10-14 NOTE — ASSESSMENT & PLAN NOTE
Unclear control, recommend further work up   - May be acute reaction due to hyperglycemia vs NAFLD  - F/u 1 mo to review US

## 2022-10-14 NOTE — ASSESSMENT & PLAN NOTE
Uncontrolled, changes made today: increase metgormin dose and start semaglutide  - Semaglutide has the added benefit of weight loss, consider alternative if cost prohibitive   - f/u 1 mo to discuss lifestyle changes

## 2022-10-18 LAB
EKG ATRIAL RATE: 81 BPM
EKG DIAGNOSIS: NORMAL
EKG P AXIS: 56 DEGREES
EKG P-R INTERVAL: 146 MS
EKG Q-T INTERVAL: 376 MS
EKG QRS DURATION: 76 MS
EKG QTC CALCULATION (BAZETT): 436 MS
EKG R AXIS: 11 DEGREES
EKG T AXIS: 44 DEGREES
EKG VENTRICULAR RATE: 81 BPM

## 2022-10-20 ENCOUNTER — HOSPITAL ENCOUNTER (OUTPATIENT)
Dept: ULTRASOUND IMAGING | Age: 37
Discharge: HOME | End: 2022-10-20
Payer: COMMERCIAL

## 2022-10-20 DIAGNOSIS — R74.01 TRANSAMINITIS: ICD-10-CM

## 2022-10-20 PROCEDURE — 76705 ECHO EXAM OF ABDOMEN: CPT

## 2022-11-14 ENCOUNTER — OFFICE VISIT (OUTPATIENT)
Dept: PRIMARY CARE CLINIC | Age: 37
End: 2022-11-14
Payer: COMMERCIAL

## 2022-11-14 VITALS
HEART RATE: 82 BPM | HEIGHT: 63 IN | DIASTOLIC BLOOD PRESSURE: 68 MMHG | OXYGEN SATURATION: 97 % | SYSTOLIC BLOOD PRESSURE: 120 MMHG | BODY MASS INDEX: 40.22 KG/M2 | RESPIRATION RATE: 18 BRPM | TEMPERATURE: 97.5 F | WEIGHT: 227 LBS

## 2022-11-14 DIAGNOSIS — E78.2 MIXED HYPERLIPIDEMIA: ICD-10-CM

## 2022-11-14 DIAGNOSIS — E11.9 TYPE 2 DIABETES MELLITUS WITHOUT COMPLICATION, WITHOUT LONG-TERM CURRENT USE OF INSULIN (HCC): Primary | ICD-10-CM

## 2022-11-14 DIAGNOSIS — K75.81 NASH (NONALCOHOLIC STEATOHEPATITIS): ICD-10-CM

## 2022-11-14 DIAGNOSIS — E87.1 HYPONATREMIA: ICD-10-CM

## 2022-11-14 DIAGNOSIS — R19.7 DIARRHEA, UNSPECIFIED TYPE: ICD-10-CM

## 2022-11-14 DIAGNOSIS — R74.01 TRANSAMINITIS: ICD-10-CM

## 2022-11-14 PROBLEM — E78.5 HYPERLIPIDEMIA: Status: ACTIVE | Noted: 2022-11-14

## 2022-11-14 PROCEDURE — 3046F HEMOGLOBIN A1C LEVEL >9.0%: CPT | Performed by: STUDENT IN AN ORGANIZED HEALTH CARE EDUCATION/TRAINING PROGRAM

## 2022-11-14 PROCEDURE — 99214 OFFICE O/P EST MOD 30 MIN: CPT | Performed by: STUDENT IN AN ORGANIZED HEALTH CARE EDUCATION/TRAINING PROGRAM

## 2022-11-14 RX ORDER — METFORMIN HYDROCHLORIDE 500 MG/1
2000 TABLET, EXTENDED RELEASE ORAL
Qty: 360 TABLET | Refills: 3
Start: 2022-11-14 | End: 2023-11-09

## 2022-11-14 RX ORDER — SEMAGLUTIDE 1.34 MG/ML
0.25 INJECTION, SOLUTION SUBCUTANEOUS WEEKLY
Qty: 1 ADJUSTABLE DOSE PRE-FILLED PEN SYRINGE | Refills: 3
Start: 2022-11-14 | End: 2023-06-06

## 2022-11-14 ASSESSMENT — PATIENT HEALTH QUESTIONNAIRE - PHQ9
5. POOR APPETITE OR OVEREATING: 2
2. FEELING DOWN, DEPRESSED OR HOPELESS: 1
3. TROUBLE FALLING OR STAYING ASLEEP: 1
1. LITTLE INTEREST OR PLEASURE IN DOING THINGS: 0
SUM OF ALL RESPONSES TO PHQ QUESTIONS 1-9: 5
SUM OF ALL RESPONSES TO PHQ QUESTIONS 1-9: 5
4. FEELING TIRED OR HAVING LITTLE ENERGY: 1
8. MOVING OR SPEAKING SO SLOWLY THAT OTHER PEOPLE COULD HAVE NOTICED. OR THE OPPOSITE, BEING SO FIGETY OR RESTLESS THAT YOU HAVE BEEN MOVING AROUND A LOT MORE THAN USUAL: 0
10. IF YOU CHECKED OFF ANY PROBLEMS, HOW DIFFICULT HAVE THESE PROBLEMS MADE IT FOR YOU TO DO YOUR WORK, TAKE CARE OF THINGS AT HOME, OR GET ALONG WITH OTHER PEOPLE: 1
9. THOUGHTS THAT YOU WOULD BE BETTER OFF DEAD, OR OF HURTING YOURSELF: 0
SUM OF ALL RESPONSES TO PHQ QUESTIONS 1-9: 5
SUM OF ALL RESPONSES TO PHQ QUESTIONS 1-9: 5
SUM OF ALL RESPONSES TO PHQ9 QUESTIONS 1 & 2: 1
6. FEELING BAD ABOUT YOURSELF - OR THAT YOU ARE A FAILURE OR HAVE LET YOURSELF OR YOUR FAMILY DOWN: 0
7. TROUBLE CONCENTRATING ON THINGS, SUCH AS READING THE NEWSPAPER OR WATCHING TELEVISION: 0

## 2022-11-14 ASSESSMENT — ANXIETY QUESTIONNAIRES
IF YOU CHECKED OFF ANY PROBLEMS ON THIS QUESTIONNAIRE, HOW DIFFICULT HAVE THESE PROBLEMS MADE IT FOR YOU TO DO YOUR WORK, TAKE CARE OF THINGS AT HOME, OR GET ALONG WITH OTHER PEOPLE: NOT DIFFICULT AT ALL
7. FEELING AFRAID AS IF SOMETHING AWFUL MIGHT HAPPEN: 0
3. WORRYING TOO MUCH ABOUT DIFFERENT THINGS: 0
6. BECOMING EASILY ANNOYED OR IRRITABLE: 1
4. TROUBLE RELAXING: 1
GAD7 TOTAL SCORE: 2
5. BEING SO RESTLESS THAT IT IS HARD TO SIT STILL: 0
1. FEELING NERVOUS, ANXIOUS, OR ON EDGE: 0
2. NOT BEING ABLE TO STOP OR CONTROL WORRYING: 0

## 2022-11-14 ASSESSMENT — ENCOUNTER SYMPTOMS
SHORTNESS OF BREATH: 0
NAUSEA: 0
ABDOMINAL PAIN: 0
EYE DISCHARGE: 0
SORE THROAT: 0
VOMITING: 0
COUGH: 0
EYE PAIN: 0
RHINORRHEA: 0

## 2022-11-14 NOTE — ASSESSMENT & PLAN NOTE
Unclear control, likely secondary to steato hepatitis and DKA  - Repeat hepatic panel, expect significant improvement but may continue to be elevated due to steatohepatitis   - f/u 2 mo

## 2022-11-14 NOTE — PROGRESS NOTES
Adjustable Dose Pre-filled Pen Syringe, Rfl: 3       Patient Active Problem List   Diagnosis    Chronic fatigue    Social anxiety disorder    Type 2 diabetes mellitus without complication, without long-term current use of insulin ([facility] [address]    Transaminitis    PIZARRO (nonalcoholic steatohepatitis)    Hyperlipidemia         Past Medical History:    No past medical history on file. Past Surgical History:  Past Surgical History:   Procedure Laterality Date     SECTION      X 2       Home Meds:  Prior to Visit Medications    Medication Sig Taking? Authorizing Provider   metFORMIN (GLUCOPHAGE-XR) 500 MG extended release tablet Take 4 tablets by mouth daily (with breakfast) Yes Mariela Echeverria, DO   Semaglutide,0.25 or 0.5MG/DOS, (OZEMPIC, 0.25 OR 0.5 MG/DOSE,) 2 MG/1.5ML SOPN Inject 0.25 mg into the skin once a week for 30 doses Yes Mariela Echeverria, DO       Allergies:    Patient has no known allergies.     Family History:       Problem Relation Age of Onset    Cancer Mother         breast    Cancer Maternal Uncle         brain    Cancer Maternal Grandmother         lymphoma         Health Maintenance Completed:  Health Maintenance   Topic Date Due    Varicella vaccine (1 of 2 - 2-dose childhood series) Never done    Pneumococcal 0-64 years Vaccine (1 - PCV) Never done    Diabetic foot exam  Never done    Diabetic retinal exam  Never done    Hepatitis B vaccine (1 of 3 - Risk 3-dose series) Never done    DTaP/Tdap/Td vaccine (1 - Tdap) Never done    COVID-19 Vaccine (2 - Booster for Jessica series) 2021    Cervical cancer screen  10/01/2021    Flu vaccine (1) 2023 (Originally 2022)    A1C test (Diabetic or Prediabetic)  2023    Diabetic microalbuminuria test  10/13/2023    Lipids  10/13/2023    Depression Screen  10/13/2023    Hepatitis C screen  Completed    HIV screen  Completed    Hepatitis A vaccine  Aged Out    Hib vaccine  Aged Out    Meningococcal (ACWY) vaccine  Aged Out and Affect: Mood normal.         Behavior: Behavior normal.            Lab Review: pertinent labs reviewed       Assessment/Plan:  Bronson Bardales was seen today for diabetes. Diagnoses and all orders for this visit:    Yady Ritchie 44 y.o. female has Chronic fatigue; Social anxiety disorder; Type 2 diabetes mellitus without complication, without long-term current use of insulin ([facility] [address]  Transaminitis; PIZARRO (nonalcoholic steatohepatitis); and Hyperlipidemia on their problem list.   Problem List          Digestive    PIZARRO (nonalcoholic steatohepatitis)      Uncontrolled, continue current medications and lifestyle modifications recommended   - Will likely improve with weight loss and lifestyle changes  - Repeat imaging not indicated at this time, if AST ALT continue to be elevated despite significant weight loss consider further work up            Endocrine    Type 2 diabetes mellitus without complication, without long-term current use of insulin (HCC) - Primary      Uncontrolled, changes made today: increase dose of metformin to 2000mg gradually if tolerated   - f/u 2 months for repeat A1c  - Nutrition education resources included in patient hand out as well as referral placed  - not on ace/ arb or statin due to reproductive age, BP well controlled and microalbumin/ cr <30           Relevant Medications    metFORMIN (GLUCOPHAGE-XR) 500 MG extended release tablet    Semaglutide,0.25 or 0.5MG/DOS, (OZEMPIC, 0.25 OR 0.5 MG/DOSE,) 2 MG/1.5ML SOPN    Other Relevant Orders    [facility], Nutrition Services, Steven    Hepatic Function Panel    Basic Metabolic Panel       Other    Transaminitis      Unclear control, likely secondary to steato hepatitis and DKA  - Repeat hepatic panel, expect significant improvement but may continue to be elevated due to steatohepatitis   - f/u 2 mo         Hyperlipidemia      Uncontrolled, continue current medications and lifestyle modifications recommended   - Likely will improve with weight loss   - not on statin due to reproductive age and age <40  - Triglycerides >500, increased risk of pancreatitis. Recommend fasting lipid panel in 2 mo. - f/u 2 mo           Hyponatremia  - Labs from ED reveal hyponatremia, likely secondary to osmotic diuresis in the setting of DKA. Not rechecked at last visit as it was shortly after ED visit and diabetes still uncontrolled with polyuria and polydipsia. - Basic Metabolic Panel; Future    Diarrhea, unspecified type  - Concern for celiac vs gluten vs wheat intolerance  - If diarrhea persists consider further work up and/or colonoscopy  - f/u 2 mo  -     IgA; Future  -     TISSUE TRANSGLUTAMINASE, IGA; Future    Patient declined pneumovax today    Health Maintenance Due:  Health Maintenance Due   Topic Date Due    Varicella vaccine (1 of 2 - 2-dose childhood series) Never done    Pneumococcal 0-64 years Vaccine (1 - PCV) Never done    Diabetic foot exam  Never done    Diabetic retinal exam  Never done    Hepatitis B vaccine (1 of 3 - Risk 3-dose series) Never done    DTaP/Tdap/Td vaccine (1 - Tdap) Never done    COVID-19 Vaccine (2 - Booster for Jessica series) 05/29/2021    Cervical cancer screen  10/01/2021            RTC:  Return in about 2 months (around 1/14/2023). EMR Dragon/transcription disclaimer:  Much of this encounter note is electronic transcription/translation of spoken language to printed texts. The electronic translation of spoken language may be erroneous, or at times, nonsensical words or phrases may be inadvertently transcribed.   Although I have reviewed the note for such errors, some may still exist.

## 2022-11-14 NOTE — PATIENT INSTRUCTIONS
Automated Self-Administered 24-Hour Dietary Assessment Tool (ASA24)--available at https://asa24.nci.nih.gov    USDA Dietary Reference Intake Calculator for Healthcare Professionals (http://www.eldon-samaria.net/)    MyChild at Meal Time questionnaire, a brief validated tool that identifies parenting nutrition habits that may increase risk of poor nutrition and obesity in children ([email or web address])    [employer] natalie ([employer])

## 2022-11-15 NOTE — ASSESSMENT & PLAN NOTE
Uncontrolled, continue current medications and lifestyle modifications recommended   - Will likely improve with weight loss and lifestyle changes  - Repeat imaging not indicated at this time, if AST ALT continue to be elevated despite significant weight loss consider further work up

## 2022-11-15 NOTE — ASSESSMENT & PLAN NOTE
Uncontrolled, changes made today: increase dose of metformin to 2000mg gradually if tolerated   - f/u 2 months for repeat A1c  - Nutrition education resources included in patient hand out as well as referral placed  - not on ace/ arb or statin due to reproductive age, BP well controlled and microalbumin/ cr <30

## 2022-11-21 ENCOUNTER — HOSPITAL ENCOUNTER (OUTPATIENT)
Age: 37
Discharge: HOME | End: 2022-11-21
Payer: COMMERCIAL

## 2022-11-21 DIAGNOSIS — R74.01 TRANSAMINITIS: ICD-10-CM

## 2022-11-21 DIAGNOSIS — E87.1 HYPONATREMIA: ICD-10-CM

## 2022-11-21 DIAGNOSIS — R19.7 DIARRHEA, UNSPECIFIED TYPE: ICD-10-CM

## 2022-11-21 LAB
ALBUMIN SERPL-MCNC: 5 G/DL (ref 3.4–5)
ALP BLD-CCNC: 93 U/L (ref 40–129)
ALT SERPL-CCNC: 75 U/L (ref 10–40)
ANION GAP SERPL CALCULATED.3IONS-SCNC: 16 MMOL/L (ref 3–16)
AST SERPL-CCNC: 51 U/L (ref 15–37)
BILIRUB SERPL-MCNC: 0.8 MG/DL (ref 0–1)
BILIRUBIN DIRECT: <0.2 MG/DL (ref 0–0.3)
BILIRUBIN, INDIRECT: ABNORMAL MG/DL (ref 0–1)
BUN BLDV-MCNC: 8 MG/DL (ref 7–20)
CALCIUM SERPL-MCNC: 10 MG/DL (ref 8.3–10.6)
CHLORIDE BLD-SCNC: 102 MMOL/L (ref 99–110)
CO2: 24 MMOL/L (ref 21–32)
CREAT SERPL-MCNC: 0.9 MG/DL (ref 0.6–1.1)
GFR SERPL CREATININE-BSD FRML MDRD: >60 ML/MIN/{1.73_M2}
GLUCOSE BLD-MCNC: 89 MG/DL (ref 70–99)
IGA: 118 MG/DL (ref 70–400)
POTASSIUM SERPL-SCNC: 3.9 MMOL/L (ref 3.5–5.1)
SODIUM BLD-SCNC: 142 MMOL/L (ref 136–145)
TOTAL PROTEIN: 7.6 G/DL (ref 6.4–8.2)

## 2022-11-21 PROCEDURE — 83516 IMMUNOASSAY NONANTIBODY: CPT

## 2022-11-21 PROCEDURE — 82784 ASSAY IGA/IGD/IGG/IGM EACH: CPT

## 2022-11-21 PROCEDURE — 36415 COLL VENOUS BLD VENIPUNCTURE: CPT

## 2022-11-21 PROCEDURE — 80076 HEPATIC FUNCTION PANEL: CPT

## 2022-11-21 PROCEDURE — 80048 BASIC METABOLIC PNL TOTAL CA: CPT

## 2022-11-22 LAB — TISSUE TRANSGLUTAMINASE IGA: <0.5 U/ML (ref 0–14)

## 2022-12-10 DIAGNOSIS — E11.9 TYPE 2 DIABETES MELLITUS WITHOUT COMPLICATION, WITHOUT LONG-TERM CURRENT USE OF INSULIN (HCC): ICD-10-CM

## 2022-12-12 NOTE — TELEPHONE ENCOUNTER
Refill Request       Last Seen: Last Seen Department: 11/14/2022  Last Seen by PCP: 11/14/2022    Last Written: 11/14/2022    Next Appointment:   Future Appointments   Date Time Provider Brock Porter   1/19/2023  1:00 PM Mariela Echeverria DO [facility]             Requested Prescriptions     Pending Prescriptions Disp Refills    metFORMIN (GLUCOPHAGE-XR) 500 MG extended release tablet 360 tablet 3     Sig: Take 4 tablets by mouth daily (with breakfast)

## 2022-12-13 RX ORDER — METFORMIN HYDROCHLORIDE 500 MG/1
2000 TABLET, EXTENDED RELEASE ORAL
Qty: 360 TABLET | Refills: 3 | Status: SHIPPED | OUTPATIENT
Start: 2022-12-13 | End: 2023-12-08

## 2023-01-31 ENCOUNTER — OFFICE VISIT (OUTPATIENT)
Dept: PRIMARY CARE CLINIC | Age: 38
End: 2023-01-31
Payer: COMMERCIAL

## 2023-01-31 VITALS
BODY MASS INDEX: 35.86 KG/M2 | TEMPERATURE: 98.3 F | HEIGHT: 63 IN | DIASTOLIC BLOOD PRESSURE: 70 MMHG | HEART RATE: 93 BPM | WEIGHT: 202.4 LBS | OXYGEN SATURATION: 97 % | SYSTOLIC BLOOD PRESSURE: 100 MMHG

## 2023-01-31 DIAGNOSIS — Z00.00 HEALTHCARE MAINTENANCE: ICD-10-CM

## 2023-01-31 DIAGNOSIS — E11.9 TYPE 2 DIABETES MELLITUS WITHOUT COMPLICATION, WITHOUT LONG-TERM CURRENT USE OF INSULIN (HCC): Primary | ICD-10-CM

## 2023-01-31 DIAGNOSIS — E78.2 MIXED HYPERLIPIDEMIA: ICD-10-CM

## 2023-01-31 LAB — HBA1C MFR BLD: 4.6 %

## 2023-01-31 PROCEDURE — 3044F HG A1C LEVEL LT 7.0%: CPT | Performed by: STUDENT IN AN ORGANIZED HEALTH CARE EDUCATION/TRAINING PROGRAM

## 2023-01-31 PROCEDURE — 99214 OFFICE O/P EST MOD 30 MIN: CPT | Performed by: STUDENT IN AN ORGANIZED HEALTH CARE EDUCATION/TRAINING PROGRAM

## 2023-01-31 PROCEDURE — 83036 HEMOGLOBIN GLYCOSYLATED A1C: CPT | Performed by: STUDENT IN AN ORGANIZED HEALTH CARE EDUCATION/TRAINING PROGRAM

## 2023-01-31 RX ORDER — SEMAGLUTIDE 1.34 MG/ML
0.5 INJECTION, SOLUTION SUBCUTANEOUS WEEKLY
Qty: 1 ADJUSTABLE DOSE PRE-FILLED PEN SYRINGE | Refills: 3 | Status: SHIPPED | OUTPATIENT
Start: 2023-01-31 | End: 2023-08-23

## 2023-01-31 ASSESSMENT — ANXIETY QUESTIONNAIRES
5. BEING SO RESTLESS THAT IT IS HARD TO SIT STILL: 0
4. TROUBLE RELAXING: 0
2. NOT BEING ABLE TO STOP OR CONTROL WORRYING: 0
6. BECOMING EASILY ANNOYED OR IRRITABLE: 0
GAD7 TOTAL SCORE: 1
3. WORRYING TOO MUCH ABOUT DIFFERENT THINGS: 0
7. FEELING AFRAID AS IF SOMETHING AWFUL MIGHT HAPPEN: 0
IF YOU CHECKED OFF ANY PROBLEMS ON THIS QUESTIONNAIRE, HOW DIFFICULT HAVE THESE PROBLEMS MADE IT FOR YOU TO DO YOUR WORK, TAKE CARE OF THINGS AT HOME, OR GET ALONG WITH OTHER PEOPLE: NOT DIFFICULT AT ALL
1. FEELING NERVOUS, ANXIOUS, OR ON EDGE: 1

## 2023-01-31 ASSESSMENT — ENCOUNTER SYMPTOMS
RESPIRATORY NEGATIVE: 1
VOMITING: 0
DIARRHEA: 0
NAUSEA: 0
ABDOMINAL PAIN: 0
SHORTNESS OF BREATH: 0
EYES NEGATIVE: 1

## 2023-01-31 ASSESSMENT — PATIENT HEALTH QUESTIONNAIRE - PHQ9
1. LITTLE INTEREST OR PLEASURE IN DOING THINGS: 0
7. TROUBLE CONCENTRATING ON THINGS, SUCH AS READING THE NEWSPAPER OR WATCHING TELEVISION: 0
9. THOUGHTS THAT YOU WOULD BE BETTER OFF DEAD, OR OF HURTING YOURSELF: 0
SUM OF ALL RESPONSES TO PHQ QUESTIONS 1-9: 1
SUM OF ALL RESPONSES TO PHQ QUESTIONS 1-9: 1
6. FEELING BAD ABOUT YOURSELF - OR THAT YOU ARE A FAILURE OR HAVE LET YOURSELF OR YOUR FAMILY DOWN: 0
10. IF YOU CHECKED OFF ANY PROBLEMS, HOW DIFFICULT HAVE THESE PROBLEMS MADE IT FOR YOU TO DO YOUR WORK, TAKE CARE OF THINGS AT HOME, OR GET ALONG WITH OTHER PEOPLE: 0
4. FEELING TIRED OR HAVING LITTLE ENERGY: 0
3. TROUBLE FALLING OR STAYING ASLEEP: 0
8. MOVING OR SPEAKING SO SLOWLY THAT OTHER PEOPLE COULD HAVE NOTICED. OR THE OPPOSITE, BEING SO FIGETY OR RESTLESS THAT YOU HAVE BEEN MOVING AROUND A LOT MORE THAN USUAL: 0
5. POOR APPETITE OR OVEREATING: 0
SUM OF ALL RESPONSES TO PHQ QUESTIONS 1-9: 1
2. FEELING DOWN, DEPRESSED OR HOPELESS: 1
SUM OF ALL RESPONSES TO PHQ9 QUESTIONS 1 & 2: 1
SUM OF ALL RESPONSES TO PHQ QUESTIONS 1-9: 1

## 2023-01-31 NOTE — ASSESSMENT & PLAN NOTE
Uncontrolled, continue current medications and lifestyle modifications recommended   - Likely will improve with weight loss   - not on statin due to reproductive age and age <40  - Triglycerides >500, increased risk of pancreatitis. Recommend fasting lipid panel, ordered today, patient plans to get it drawn a few days before next appointment.   - f/u 3 mo

## 2023-01-31 NOTE — ASSESSMENT & PLAN NOTE
Improved, continue lifestyle management and semaglutide   - Patient congratulated on lifestyle changes and recent weight loss  - Discussed continuing semaglutide long term to maintain weight loss once at goal  - f/u 3 mo

## 2023-01-31 NOTE — ASSESSMENT & PLAN NOTE
Uncontrolled, changes made today: increase dose of semaglutide  - Patient congratulated on having a POC A1C in the normal range!  - f/u 3 months  - microalbumin/ cr ordered today but patient plans to get it a few days before next appointment

## 2023-01-31 NOTE — PROGRESS NOTES
[address] and [facility]                                             [address] [place] [address],  Daniel Joseph        Phone: [phone]      Name:  Checo Shrestha  :    1985    Consultants:   Patient Care Team:  Deann Echeverria DO as PCP - General (Family Medicine)    Chief Complaint:     Checo Shrestha is a 44 y.o. female  who presents today for an established patient care visit with Personalized Prevention Plan Services as noted below. HPI:     Checo Shrestha is a 44 y.o. female with past medical history significant for Type 2 Diabetes Mellitus, PIZARRO with transaminitis, hyperlipidemia, social anxiety disorder, and chronic fatigue syndrome who presents today for follow up on diabetes management. DMTII  Metformin increase from 1000 to 2000mg daily- has been well tolerated. Reports GI upset and diarrhea previously experienced have stopped. Semaglutide 0.25 - no abdominal pain, GI upset, no problems with weekly injections. Lifestyle management: Decreasing portions, decreasing fast food and processed food. Decision to defer ACE/ARB at prior appointment due to reproductive age and patient wanted to try lifestyle management prior to starting medication. Hemoglobin A1C   Date Value Ref Range Status   2023 4.6 % Final        BMI 35.85  She is heartily congratulated on an excellent job with lifestyle changes and successful management of their medical conditions. Wt Readings from Last 3 Encounters:   23 202 lb 6.4 oz (91.8 kg)   22 227 lb (103 kg)   10/13/22 236 lb 3.2 oz (107.1 kg)      HLD  Patient has been working on lifestyle changes noted above. Decision to defer statin treatment was made at prior appointment to due reproductive age and patient wanted to try lifestyle management prior to starting medication.    Lab Results   Component Value Date    CHOL 214 (H) 10/13/2022    TRIG 597 (H) 10/13/2022    HDL 24 (L) 10/13/2022    LDLCALC see below 10/13/2022    LABVLDL see below 10/13/2022           Patient Active Problem List   Diagnosis    Chronic fatigue    Social anxiety disorder    Type 2 diabetes mellitus without complication, without long-term current use of insulin (HCC)    Transaminitis    PIZARRO (nonalcoholic steatohepatitis)    Hyperlipidemia    BMI 35.0-35.9,adult       Past Medical History:    No past medical history on file. Past Surgical History:  Past Surgical History:   Procedure Laterality Date     SECTION      X 2       Home Meds:  Prior to Visit Medications    Medication Sig Taking? Authorizing Provider   Semaglutide,0.25 or 0.5MG/DOS, (OZEMPIC, 0.25 OR 0.5 MG/DOSE,) 2 MG/1.5ML SOPN Inject 0.5 mg into the skin once a week for 30 doses Yes Magdelene K May, DO   metFORMIN (GLUCOPHAGE-XR) 500 MG extended release tablet Take 4 tablets by mouth daily (with breakfast) Yes Magdelene K May, DO       Allergies:    Patient has no known allergies.     Family History:       Problem Relation Age of Onset    Cancer Mother         breast    Cancer Maternal Uncle         brain    Cancer Maternal Grandmother         lymphoma    Cancer Maternal Uncle          Health Maintenance Completed:  Health Maintenance   Topic Date Due    Pneumococcal 0-64 years Vaccine (1 - PCV) Never done    Hepatitis B vaccine (1 of 3 - Risk 3-dose series) Never done    DTaP/Tdap/Td vaccine (1 - Tdap) Never done    COVID-19 Vaccine (2 - Booster for Jessica series) 2021    Cervical cancer screen  10/01/2021    Flu vaccine (1) 2023 (Originally 2022)    Diabetic foot exam  2024 (Originally 1994)    Diabetic retinal exam  02/10/2024 (Originally 2002)    Diabetic Alb to Cr ratio (uACR) test  10/13/2023    Lipids  10/13/2023    Depression Screen  2023    GFR test (Diabetes, CKD 3-4, OR last GFR 15-59)  2023    A1C test (Diabetic or Prediabetic) 01/31/2024    Hepatitis C screen  Completed    HIV screen  Completed    Hepatitis A vaccine  Aged Out    Hib vaccine  Aged Out    Meningococcal (ACWY) vaccine  Aged Out    Varicella vaccine  Discontinued          Immunization History   Administered Date(s) Administered    COVID-19, J&J, (age 18y+), IM, 0.5 mL 04/03/2021    Rho (D) Immune Globulin 11/24/2013         Review of Systems:  Review of Systems   Constitutional:  Positive for appetite change. Negative for fatigue and fever. HENT: Negative. Eyes: Negative. Respiratory: Negative. Negative for shortness of breath. Cardiovascular:  Negative for chest pain. Gastrointestinal:  Negative for abdominal pain, diarrhea, nausea and vomiting. Endocrine: Negative for polydipsia, polyphagia and polyuria. Neurological:  Negative for dizziness, syncope and weakness. Physical Exam:   Vitals:    01/31/23 0852   BP: 100/70   Pulse: 93   Temp: 98.3 °F (36.8 °C)   SpO2: 97%     Body mass index is 35.85 kg/m². Wt Readings from Last 3 Encounters:   01/31/23 202 lb 6.4 oz (91.8 kg)   11/14/22 227 lb (103 kg)   10/13/22 236 lb 3.2 oz (107.1 kg)     BP Readings from Last 3 Encounters:   01/31/23 100/70   11/14/22 120/68   10/09/22 117/77         Physical Exam  HENT:      Head: Normocephalic and atraumatic. Mouth/Throat:      Mouth: Mucous membranes are moist.   Eyes:      Extraocular Movements: Extraocular movements intact. Cardiovascular:      Rate and Rhythm: Normal rate and regular rhythm. Heart sounds: No murmur heard. No friction rub. No gallop. Pulmonary:      Breath sounds: No wheezing, rhonchi or rales. Abdominal:      Tenderness: There is no abdominal tenderness. There is no guarding or rebound. Musculoskeletal:         General: No swelling. Skin:     General: Skin is warm and dry. Psychiatric:         Mood and Affect: Mood normal.         Thought Content:  Thought content normal.       Lab Review:     Hemoglobin A1C   Date Value Ref Range Status   01/31/2023 4.6 % Final     Estimated Creatinine Clearance: 91 mL/min (based on SCr of 0.9 mg/dL). Other pertinent labs reviewed, significant for continued but improved transaminitis. Assessment/Plan:    Checo Shrestha is a 44 y.o. female with past medical history significant for Type 2 Diabetes Mellitus, PIZARRO with transaminitis, hyperlipidemia, social anxiety disorder, and chronic fatigue syndrome who presents today for follow up on diabetes management. Type 2 Diabetes Mellitus:   Continue current dose of metformin 2000mg daily. Increase semaglutide to 0.5 primarily for weight loss benefit. - repeat A1C today 4.6, well controlled  - Diabetes foot exam today  - Had retinal exam in September 2022  - Declined recommended vaccines  - plan to repeat urine microalbumin and A1C in 3 months prior to next appointment, not on ACEi due to childbearing age and not currently using contraception    2. PIZARRO with transaminitis  - Expect continued improvement with lifestyle/weight management  - CMP for prior to next appointment  - If no improvement consider referral for fibroscan at next appointment in three months    3. Hyperlipidemia  - will hold off on starting statin, primary prevention of cardiovascular disease in DM ages 43-69. ASCVD risk calculator not validated in patients <40.  - plan to repeat lipid panel in 3 months prior to next appointment      Health Maintenance Due:  Health Maintenance Due   Topic Date Due    Pneumococcal 0-64 years Vaccine (1 - PCV) Never done    Hepatitis B vaccine (1 of 3 - Risk 3-dose series) Never done    DTaP/Tdap/Td vaccine (1 - Tdap) Never done    COVID-19 Vaccine (2 - Booster for Jessica series) 05/29/2021    Cervical cancer screen  10/01/2021          Health Maintenance: (USPSTF Recommendations)  (F) Cervical Cancer Screen: (21-29 q3yr cytology alone; 30-65 q3yr cytology alone, q5yr with hrHPV alone, or q5yr cytology+hrHPV (A)).  Plans to schedule in next several weeks. RTC:    Follow up in three months for A1C, lipid panel, CMP, and monitor tolerance of semaglutide dose increase. The above is written by a medical student, please see below for resident/attending attestation and plan. RESIDENT/ATTENDING ATTESTATION:    After medical student evaluation and exam, I independently gathered patients history, independently did a physical, and agree with A/P as written in medical student's note (other than clarified below). Please see below for additional information documented by the resident/attending including the A/P. Assessment/Plan:  Gordon Ritchie 44 y.o. female has Chronic fatigue; Social anxiety disorder; Type 2 diabetes mellitus without complication, without long-term current use of insulin ([facility] Transaminitis; PIZARRO (nonalcoholic steatohepatitis); Hyperlipidemia; and BMI 35.0-35.9,adult on their problem list.   Problem List          Endocrine    Type 2 diabetes mellitus without complication, without long-term current use of insulin ([facility]) - Primary      Uncontrolled, changes made today: increase dose of semaglutide  - Patient congratulated on having a POC A1C in the normal range!  - f/u 3 months  - microalbumin/ cr ordered today but patient plans to get it a few days before next appointment         Relevant Medications    metFORMIN (GLUCOPHAGE-XR) 500 MG extended release tablet    Semaglutide,0.25 or 0.5MG/DOS, (OZEMPIC, 0.25 OR 0.5 MG/DOSE,) 2 MG/1.5ML SOPN    Other Relevant Orders    POCT glycosylated hemoglobin (Hb A1C) (Completed)    MICROALBUMIN / CREATININE URINE RATIO       Other    Hyperlipidemia      Uncontrolled, continue current medications and lifestyle modifications recommended   - Likely will improve with weight loss   - not on statin due to reproductive age and age <40  - Triglycerides >500, increased risk of pancreatitis.  Recommend fasting lipid panel, ordered today, patient plans to get it drawn a few days before next appointment. - f/u 3 mo         Relevant Orders    Lipid, Fasting    BMI 35.0-35.9,adult      Improved, continue lifestyle management and semaglutide   - Patient congratulated on lifestyle changes and recent weight loss  - Discussed continuing semaglutide long term to maintain weight loss once at goal  - f/u 3 mo         Relevant Medications    Semaglutide,0.25 or 0.5MG/DOS, (OZEMPIC, 0.25 OR 0.5 MG/DOSE,) 2 MG/1.5ML SOPN            EMR Dragon/transcription disclaimer:  Much of this encounter note is electronic transcription/translation of spoken language to printed texts. The electronic translation of spoken language may be erroneous, or at times, nonsensical words or phrases may be inadvertently transcribed.   Although I have reviewed the note for such errors, some may still exist.

## 2023-01-31 NOTE — PROGRESS NOTES
[address] and [facility]                                             [address] [facility], [address] [place] [address], [address] [place] [address]        Phone: [phone]      Name:  Gely Hernandez  :    1985    Consultants:   Patient Care Team:  Bret Echeverria DO as PCP - General (Family Medicine)    Chief Complaint:     Gely Hernandez is a 44 y.o. female  who presents today for an established patient care visit with Personalized Prevention Plan Services as noted below. HPI:     Gely Hernandez is a 44 y.o. female with past medical history significant for Type 2 Diabetes Mellitus, PIZARRO with transaminitis, hyperlipidemia, social anxiety disorder, and chronic fatigue syndrome who presents today for follow up on diabetes management. Chronic Problems:     Diabetes Management:  Metformin increase from 1000 to 2000mg daily- has been well tolerated. Reports GI upset and diarrhea previously experienced have stopped. Semaglutide 0.25 - no abdominal pain, GI upset, no problems with weekly injections. Lifestyle management: Decreasing portions, decreasing fast food and processed food. Patient Active Problem List   Diagnosis    Chronic fatigue    Social anxiety disorder    Type 2 diabetes mellitus without complication, without long-term current use of insulin ([address]    Transaminitis    PIZARRO (nonalcoholic steatohepatitis)    Hyperlipidemia       Past Medical History:    No past medical history on file. Past Surgical History:  Past Surgical History:   Procedure Laterality Date     SECTION      X 2       Home Meds:  Prior to Visit Medications    Medication Sig Taking?  Authorizing Provider   metFORMIN (GLUCOPHAGE-XR) 500 MG extended release tablet Take 4 tablets by mouth daily (with breakfast)  Mariela HALLMAN May, DO   Semaglutide,0.25 or 0.5MG/DOS, (OZEMPIC, 0.25 OR 0.5 MG/DOSE,) 2 MG/1.5ML SOPN Inject 0.25 mg into the skin once a week for 30 doses  Magdelene VIJI May, DO       Allergies:    Patient has no known allergies. Family History:       Problem Relation Age of Onset    Cancer Mother         breast    Cancer Maternal Uncle         brain    Cancer Maternal Grandmother         lymphoma         Health Maintenance Completed:  Health Maintenance   Topic Date Due    Varicella vaccine (1 of 2 - 2-dose childhood series) Never done    Pneumococcal 0-64 years Vaccine (1 - PCV) Never done    Diabetic foot exam  Never done    Diabetic retinal exam  Never done    Hepatitis B vaccine (1 of 3 - Risk 3-dose series) Never done    DTaP/Tdap/Td vaccine (1 - Tdap) Never done    COVID-19 Vaccine (2 - Booster for Jessica series) 05/29/2021    Cervical cancer screen  10/01/2021    A1C test (Diabetic or Prediabetic)  01/13/2023    Flu vaccine (1) 11/14/2023 (Originally 8/1/2022)    Diabetic Alb to Cr ratio (uACR) test  10/13/2023    Lipids  10/13/2023    Depression Screen  11/14/2023    GFR test (Diabetes, CKD 3-4, OR last GFR 15-59)  11/21/2023    Hepatitis C screen  Completed    HIV screen  Completed    Hepatitis A vaccine  Aged Out    Hib vaccine  Aged Out    Meningococcal (ACWY) vaccine  Aged [employer] History   Administered Date(s) Administered    COVID-19, J&J, (age 18y+), IM, 0.5 mL 04/03/2021    Rho (D) Immune Globulin 11/24/2013         Review of Systems:  Review of Systems   Constitutional:  Positive for appetite change. Negative for fatigue and fever. HENT: Negative. Eyes: Negative. Respiratory: Negative. Negative for shortness of breath. Cardiovascular:  Negative for chest pain. Gastrointestinal:  Negative for abdominal pain, diarrhea, nausea and vomiting. Endocrine: Negative for polydipsia, polyphagia and polyuria. Neurological:  Negative for dizziness, syncope and weakness.       Physical Exam:   Vitals:    01/31/23 0852   Pulse: 93   Temp: 98.3 °F (36.8 °C)   SpO2: 97%     Body mass index is 35.85 kg/m². Wt Readings from Last 3 Encounters:   01/31/23 202 lb 6.4 oz (91.8 kg)   11/14/22 227 lb (103 kg)   10/13/22 236 lb 3.2 oz (107.1 kg)     BP Readings from Last 3 Encounters:   01/31/23 100/70   11/14/22 120/68   10/09/22 117/77         Physical Exam  HENT:      Head: Normocephalic and atraumatic. Mouth/Throat:      Mouth: Mucous membranes are moist.   Eyes:      Extraocular Movements: Extraocular movements intact. Cardiovascular:      Rate and Rhythm: Normal rate and regular rhythm. Heart sounds: No murmur heard. No friction rub. No gallop. Pulmonary:      Breath sounds: No wheezing, rhonchi or rales. Abdominal:      Tenderness: There is no abdominal tenderness. There is no guarding or rebound. Musculoskeletal:         General: No swelling. Skin:     General: Skin is warm and dry. Psychiatric:         Mood and Affect: Mood normal.         Thought Content: Thought content normal.       Lab Review:     Hemoglobin A1C   Date Value Ref Range Status   01/31/2023 4.6 % Final     Estimated Creatinine Clearance: 91 mL/min (based on SCr of 0.9 mg/dL). Other pertinent labs reviewed, significant for continued but improved transaminitis. Assessment/Plan:    Argenis Lomas is a 44 y.o. female with past medical history significant for Type 2 Diabetes Mellitus, PIZARRO with transaminitis, hyperlipidemia, social anxiety disorder, and chronic fatigue syndrome who presents today for follow up on diabetes management. Type 2 Diabetes Mellitus:   Continue current dose of metformin 2000mg daily. Increase semaglutide to 0.5 primarily for weight loss benefit. - repeat A1C today 4.6, well controlled  - Diabetes foot exam today  - Had retinal exam in September 2022  - Declined recommended vaccines  - plan to repeat urine microalbumin and A1C in 3 months prior to next appointment, not on ACEi due to childbearing age and not currently using contraception    2.  PIZARRO with transaminitis  - Expect continued improvement with lifestyle/weight management  - CMP for prior to next appointment  - If no improvement consider referral for fibroscan at next appointment in three months    3. Hyperlipidemia  - will hold off on starting statin, primary prevention of cardiovascular disease in DM ages 43-69. ASCVD risk calculator not validated in patients <40.  - plan to repeat lipid panel in 3 months prior to next appointment      Health Maintenance Due:  Health Maintenance Due   Topic Date Due    Varicella vaccine (1 of 2 - 2-dose childhood series) Never done    Pneumococcal 0-64 years Vaccine (1 - PCV) Never done    Diabetic foot exam  Never done    Diabetic retinal exam  Never done    Hepatitis B vaccine (1 of 3 - Risk 3-dose series) Never done    DTaP/Tdap/Td vaccine (1 - Tdap) Never done    COVID-19 Vaccine (2 - Booster for Jessica series) 05/29/2021    Cervical cancer screen  10/01/2021    A1C test (Diabetic or Prediabetic)  01/13/2023          Health Maintenance: (USPSTF Recommendations)  (F) Cervical Cancer Screen: (21-29 q3yr cytology alone; 30-65 q3yr cytology alone, q5yr with hrHPV alone, or q5yr cytology+hrHPV (A)). Plans to schedule in next several weeks. RTC:    Follow up in three months for A1C, lipid panel, CMP, and monitor tolerance of semaglutide dose increase. EMR Dragon/transcription disclaimer:  Much of this encounter note is electronic transcription/translation of spoken language to printed texts. The electronic translation of spoken language may be erroneous, or at times, nonsensical words or phrases may be inadvertently transcribed.   Although I have reviewed the note for such errors, some may still exist.

## 2023-01-31 NOTE — PROGRESS NOTES
Hemoglobin A1C   Date Value Ref Range Status   01/31/2023 4.6 % Final      Wt Readings from Last 3 Encounters:   01/31/23 202 lb 6.4 oz (91.8 kg)   11/14/22 227 lb (103 kg)   10/13/22 236 lb 3.2 oz (107.1 kg)      Currently taking Ozempic 0.25 mg weekly    Lab Results   Component Value Date    CHOL 214 (H) 10/13/2022    TRIG 597 (H) 10/13/2022    HDL 24 (L) 10/13/2022    LDLCALC see below 10/13/2022    LABVLDL see below 10/13/2022       The ASCVD Risk score (Gladys DK, et al., 2019) failed to calculate for the following reasons:     The 2019 ASCVD risk score is only valid for ages 36 to 78

## 2023-05-04 ENCOUNTER — HOSPITAL ENCOUNTER (OUTPATIENT)
Age: 38
Discharge: HOME | End: 2023-05-04
Payer: COMMERCIAL

## 2023-05-04 DIAGNOSIS — Z00.00 HEALTHCARE MAINTENANCE: ICD-10-CM

## 2023-05-04 DIAGNOSIS — E78.2 MIXED HYPERLIPIDEMIA: ICD-10-CM

## 2023-05-04 DIAGNOSIS — E11.9 TYPE 2 DIABETES MELLITUS WITHOUT COMPLICATION, WITHOUT LONG-TERM CURRENT USE OF INSULIN (HCC): ICD-10-CM

## 2023-05-04 LAB
CHOLEST SERPL-MCNC: 183 MG/DL (ref 0–199)
CREAT UR-MCNC: 41.7 MG/DL (ref 28–259)
HBV SURFACE AB SERPL IA-ACNC: >1000 MIU/ML
HDLC SERPL-MCNC: 34 MG/DL (ref 40–60)
LDL CHOLESTEROL CALCULATED: 112 MG/DL
MICROALBUMIN UR DL<=1MG/L-MCNC: <1.2 MG/DL
MICROALBUMIN/CREAT UR: NORMAL MG/G (ref 0–30)
TRIGL SERPL-MCNC: 184 MG/DL (ref 0–150)
VLDLC SERPL CALC-MCNC: 37 MG/DL

## 2023-05-04 PROCEDURE — 80061 LIPID PANEL: CPT

## 2023-05-04 PROCEDURE — 86706 HEP B SURFACE ANTIBODY: CPT

## 2023-05-04 PROCEDURE — 82570 ASSAY OF URINE CREATININE: CPT

## 2023-05-04 PROCEDURE — 82043 UR ALBUMIN QUANTITATIVE: CPT

## 2023-05-04 PROCEDURE — 36415 COLL VENOUS BLD VENIPUNCTURE: CPT

## 2023-05-08 ENCOUNTER — HOSPITAL ENCOUNTER (OUTPATIENT)
Age: 38
Discharge: HOME | End: 2023-05-08
Payer: COMMERCIAL

## 2023-05-08 ENCOUNTER — OFFICE VISIT (OUTPATIENT)
Dept: PRIMARY CARE CLINIC | Age: 38
End: 2023-05-08
Payer: COMMERCIAL

## 2023-05-08 VITALS
DIASTOLIC BLOOD PRESSURE: 62 MMHG | WEIGHT: 188 LBS | OXYGEN SATURATION: 98 % | RESPIRATION RATE: 16 BRPM | BODY MASS INDEX: 33.31 KG/M2 | HEIGHT: 63 IN | SYSTOLIC BLOOD PRESSURE: 112 MMHG | TEMPERATURE: 97.7 F | HEART RATE: 86 BPM

## 2023-05-08 DIAGNOSIS — E11.9 TYPE 2 DIABETES MELLITUS WITHOUT COMPLICATION, WITHOUT LONG-TERM CURRENT USE OF INSULIN (HCC): ICD-10-CM

## 2023-05-08 DIAGNOSIS — L65.9 HAIR LOSS: ICD-10-CM

## 2023-05-08 DIAGNOSIS — L65.9 HAIR LOSS: Primary | ICD-10-CM

## 2023-05-08 DIAGNOSIS — E78.2 MIXED HYPERLIPIDEMIA: ICD-10-CM

## 2023-05-08 DIAGNOSIS — E66.9 OBESITY, CLASS I, BMI 30-34.9: ICD-10-CM

## 2023-05-08 LAB
BASOPHILS # BLD: 0 K/UL (ref 0–0.2)
BASOPHILS NFR BLD: 0.4 %
DEPRECATED RDW RBC AUTO: 14.7 % (ref 12.4–15.4)
EOSINOPHIL # BLD: 0.1 K/UL (ref 0–0.6)
EOSINOPHIL NFR BLD: 2 %
FERRITIN SERPL IA-MCNC: 53.4 NG/ML (ref 15–150)
HCT VFR BLD AUTO: 38.3 % (ref 36–48)
HGB BLD-MCNC: 12.6 G/DL (ref 12–16)
LYMPHOCYTES # BLD: 2.7 K/UL (ref 1–5.1)
LYMPHOCYTES NFR BLD: 39.6 %
MCH RBC QN AUTO: 26.3 PG (ref 26–34)
MCHC RBC AUTO-ENTMCNC: 33.1 G/DL (ref 31–36)
MCV RBC AUTO: 79.7 FL (ref 80–100)
MONOCYTES # BLD: 0.3 K/UL (ref 0–1.3)
MONOCYTES NFR BLD: 4.9 %
NEUTROPHILS # BLD: 3.6 K/UL (ref 1.7–7.7)
NEUTROPHILS NFR BLD: 53.1 %
PLATELET # BLD AUTO: 257 K/UL (ref 135–450)
PMV BLD AUTO: 8.8 FL (ref 5–10.5)
RBC # BLD AUTO: 4.8 M/UL (ref 4–5.2)
TSH SERPL DL<=0.005 MIU/L-ACNC: 2.74 UIU/ML (ref 0.27–4.2)
WBC # BLD AUTO: 6.7 K/UL (ref 4–11)

## 2023-05-08 PROCEDURE — 36415 COLL VENOUS BLD VENIPUNCTURE: CPT

## 2023-05-08 PROCEDURE — 99214 OFFICE O/P EST MOD 30 MIN: CPT | Performed by: STUDENT IN AN ORGANIZED HEALTH CARE EDUCATION/TRAINING PROGRAM

## 2023-05-08 PROCEDURE — 85025 COMPLETE CBC W/AUTO DIFF WBC: CPT

## 2023-05-08 PROCEDURE — 3044F HG A1C LEVEL LT 7.0%: CPT | Performed by: STUDENT IN AN ORGANIZED HEALTH CARE EDUCATION/TRAINING PROGRAM

## 2023-05-08 PROCEDURE — 84443 ASSAY THYROID STIM HORMONE: CPT

## 2023-05-08 PROCEDURE — 82728 ASSAY OF FERRITIN: CPT

## 2023-05-08 RX ORDER — SEMAGLUTIDE 1.34 MG/ML
0.5 INJECTION, SOLUTION SUBCUTANEOUS WEEKLY
Qty: 1 ADJUSTABLE DOSE PRE-FILLED PEN SYRINGE | Refills: 3 | Status: SHIPPED | OUTPATIENT
Start: 2023-05-08 | End: 2023-11-28

## 2023-05-08 RX ORDER — METFORMIN HYDROCHLORIDE 500 MG/1
1000 TABLET, EXTENDED RELEASE ORAL
Qty: 360 TABLET | Refills: 3 | Status: SHIPPED | OUTPATIENT
Start: 2023-05-08 | End: 2024-05-02

## 2023-05-08 SDOH — ECONOMIC STABILITY: HOUSING INSECURITY
IN THE LAST 12 MONTHS, WAS THERE A TIME WHEN YOU DID NOT HAVE A STEADY PLACE TO SLEEP OR SLEPT IN A SHELTER (INCLUDING NOW)?: NO

## 2023-05-08 SDOH — ECONOMIC STABILITY: FOOD INSECURITY: WITHIN THE PAST 12 MONTHS, YOU WORRIED THAT YOUR FOOD WOULD RUN OUT BEFORE YOU GOT MONEY TO BUY MORE.: NEVER TRUE

## 2023-05-08 SDOH — ECONOMIC STABILITY: FOOD INSECURITY: WITHIN THE PAST 12 MONTHS, THE FOOD YOU BOUGHT JUST DIDN'T LAST AND YOU DIDN'T HAVE MONEY TO GET MORE.: NEVER TRUE

## 2023-05-08 SDOH — ECONOMIC STABILITY: INCOME INSECURITY: HOW HARD IS IT FOR YOU TO PAY FOR THE VERY BASICS LIKE FOOD, HOUSING, MEDICAL CARE, AND HEATING?: NOT HARD AT ALL

## 2023-05-08 ASSESSMENT — ANXIETY QUESTIONNAIRES
7. FEELING AFRAID AS IF SOMETHING AWFUL MIGHT HAPPEN: 0
4. TROUBLE RELAXING: 0
6. BECOMING EASILY ANNOYED OR IRRITABLE: 1
2. NOT BEING ABLE TO STOP OR CONTROL WORRYING: 0
5. BEING SO RESTLESS THAT IT IS HARD TO SIT STILL: 0
1. FEELING NERVOUS, ANXIOUS, OR ON EDGE: 0
IF YOU CHECKED OFF ANY PROBLEMS ON THIS QUESTIONNAIRE, HOW DIFFICULT HAVE THESE PROBLEMS MADE IT FOR YOU TO DO YOUR WORK, TAKE CARE OF THINGS AT HOME, OR GET ALONG WITH OTHER PEOPLE: NOT DIFFICULT AT ALL
3. WORRYING TOO MUCH ABOUT DIFFERENT THINGS: 1
GAD7 TOTAL SCORE: 2

## 2023-05-08 ASSESSMENT — PATIENT HEALTH QUESTIONNAIRE - PHQ9
SUM OF ALL RESPONSES TO PHQ QUESTIONS 1-9: 1
SUM OF ALL RESPONSES TO PHQ QUESTIONS 1-9: 1
6. FEELING BAD ABOUT YOURSELF - OR THAT YOU ARE A FAILURE OR HAVE LET YOURSELF OR YOUR FAMILY DOWN: 0
10. IF YOU CHECKED OFF ANY PROBLEMS, HOW DIFFICULT HAVE THESE PROBLEMS MADE IT FOR YOU TO DO YOUR WORK, TAKE CARE OF THINGS AT HOME, OR GET ALONG WITH OTHER PEOPLE: 0
1. LITTLE INTEREST OR PLEASURE IN DOING THINGS: 0
SUM OF ALL RESPONSES TO PHQ QUESTIONS 1-9: 1
2. FEELING DOWN, DEPRESSED OR HOPELESS: 0
7. TROUBLE CONCENTRATING ON THINGS, SUCH AS READING THE NEWSPAPER OR WATCHING TELEVISION: 0
9. THOUGHTS THAT YOU WOULD BE BETTER OFF DEAD, OR OF HURTING YOURSELF: 0
3. TROUBLE FALLING OR STAYING ASLEEP: 0
4. FEELING TIRED OR HAVING LITTLE ENERGY: 0
5. POOR APPETITE OR OVEREATING: 1
8. MOVING OR SPEAKING SO SLOWLY THAT OTHER PEOPLE COULD HAVE NOTICED. OR THE OPPOSITE, BEING SO FIGETY OR RESTLESS THAT YOU HAVE BEEN MOVING AROUND A LOT MORE THAN USUAL: 0
SUM OF ALL RESPONSES TO PHQ QUESTIONS 1-9: 1
SUM OF ALL RESPONSES TO PHQ9 QUESTIONS 1 & 2: 0

## 2023-05-08 ASSESSMENT — ENCOUNTER SYMPTOMS
SHORTNESS OF BREATH: 0
ABDOMINAL PAIN: 0
COUGH: 0

## 2023-05-08 NOTE — ASSESSMENT & PLAN NOTE
Borderline controlled, continue current medications   -Anticipate continued improvement with weight loss   - Recheck lipid panel annually

## 2023-05-08 NOTE — PROGRESS NOTES
[address],  [place], [address] [facility] [address]        Phone: [phone]    The following is written by a medical student, please see below for resident/attending attestation and plan. Name:  Keya Pratt  :    1985    Consultants:   Patient Care Team:  Naresh Echeverria DO as PCP - General (Family Medicine)    Chief Complaint:     Keya Pratt is a 43 y.o. female  who presents today for an established patient care visit with Personalized Prevention Plan Services as noted below. HPI:     Keya Pratt is a 43 y.o. female with PMHx of T2DM, PIZARRO with transaminitis, HLD, social anxiety disorder, and chronic fatigue syndrome presenting for chronic care follow-up with an additional concern of hair loss. Hair Loss  Patient first noticed hair loss in February and it has been progressively getting worse. Patient notices hair loss most after taking a shower or brushing her hair. States that she feels like her hair is diffusely thin without focal localization of hair loss. Denies any scalp issues or any family history of hair loss. Has started taking a hair, skin, and nails multivitamin. Denies any environmental or medication changes. Denies any new or increased stress, had increased stress in October due to health concerns. Denies fatigue, temperature sensitivity, constipation, diarrhea, hot flashes. States she has always had irregular and heavy periods, states these have been getting more regular since October. Has 1-1.5 weeks of heavy bleeding with clots. Does not take multivitamin or iron supplement. T2DM  Currently on metformin 2,000 mg daily and semaglutide 0.5 mg injected weekly. Patient denies any side effects or issues with medications.  States that she has continued with her lifestyle modifications with

## 2023-05-08 NOTE — PROGRESS NOTES
[address], [address] [place], [place] [address] [id number]        Phone: [phone]    The following is written by a medical student, please see below for resident/attending attestation and plan. Name:  Mick Potts  :    1985    Consultants:   Patient Care Team:  Kwame Echeverria DO as PCP - General (Family Medicine)    Chief Complaint:     Mick Potts is a 43 y.o. female  who presents today for an established patient care visit with Personalized Prevention Plan Services as noted below. HPI:     Mick Potts is a 43 y.o. female with PMHx of T2DM, PIZARRO with transaminitis, HLD, social anxiety disorder, and chronic fatigue syndrome presenting for chronic care follow-up with an additional concern of hair loss. Hair Loss  Patient first noticed hair loss in February and it has been progressively getting worse. Patient notices hair loss most after taking a shower or brushing her hair. States that she feels like her hair is diffusely thin without focal localization of hair loss. Denies any scalp issues or any family history of hair loss. Has started taking a hair, skin, and nails multivitamin. Denies any environmental or medication changes. Denies any new or increased stress, had increased stress in October due to health concerns. Denies fatigue, temperature sensitivity, constipation, diarrhea, hot flashes. States she has always had irregular and heavy periods, states these have been getting more regular since October. Has 1-1.5 weeks of heavy bleeding with clots. Does not take multivitamin or iron supplement. T2DM  Currently on metformin 2,000 mg daily and semaglutide 0.5 mg injected weekly. Patient denies any side effects or issues with medications.  States that she has continued with her lifestyle modifications with

## 2023-06-04 DIAGNOSIS — E11.9 TYPE 2 DIABETES MELLITUS WITHOUT COMPLICATION, WITHOUT LONG-TERM CURRENT USE OF INSULIN (HCC): ICD-10-CM

## 2023-06-05 NOTE — TELEPHONE ENCOUNTER
Refill Request       Last Seen: Last Seen Department: 5/8/2023  Last Seen by PCP: 5/8/2023    Last Written: 5/8/2023 1 pen 3 refills     Next Appointment:   Future Appointments   Date Time Provider Brock Porter   8/7/2023  9:00 AM Yassine Guerra DO [facility] AND [facility]             Requested Prescriptions     Pending Prescriptions Disp Refills    Semaglutide,0.25 or 0.5MG/DOS, (OZEMPIC, 0.25 OR 0.5 MG/DOSE,) 2 MG/1.5ML SOPN 1 Adjustable Dose Pre-filled Pen Syringe 3     Sig: Inject 0.5 mg into the skin once a week for 30 doses

## 2023-06-06 RX ORDER — SEMAGLUTIDE 1.34 MG/ML
0.5 INJECTION, SOLUTION SUBCUTANEOUS WEEKLY
Qty: 1 ADJUSTABLE DOSE PRE-FILLED PEN SYRINGE | Refills: 3 | Status: SHIPPED | OUTPATIENT
Start: 2023-06-06 | End: 2023-12-27

## 2023-07-08 DIAGNOSIS — E11.9 TYPE 2 DIABETES MELLITUS WITHOUT COMPLICATION, WITHOUT LONG-TERM CURRENT USE OF INSULIN (HCC): ICD-10-CM

## 2023-07-10 RX ORDER — SEMAGLUTIDE 1.34 MG/ML
0.5 INJECTION, SOLUTION SUBCUTANEOUS WEEKLY
Qty: 1 ADJUSTABLE DOSE PRE-FILLED PEN SYRINGE | Refills: 3 | Status: SHIPPED | OUTPATIENT
Start: 2023-07-10 | End: 2024-01-30

## 2023-07-10 NOTE — TELEPHONE ENCOUNTER
Refill Request       Last Seen: Last Seen Department: 5/8/2023  Last Seen by PCP: 5/8/2023    Last Written: 6/6/2023  #1 with 3    Next Appointment:   Future Appointments   Date Time Provider [address]   8/7/2023  9:00 AM Vickyrenetta Diggs, DO [address] AND [facility]             Requested Prescriptions     Pending Prescriptions Disp Refills    Semaglutide,0.25 or 0.5MG/DOS, (OZEMPIC, 0.25 OR 0.5 MG/DOSE,) 2 MG/1.5ML SOPN 1 Adjustable Dose Pre-filled Pen Syringe 3     Sig: Inject 0.5 mg into the skin once a week for 30 doses

## 2023-07-25 DIAGNOSIS — E11.9 TYPE 2 DIABETES MELLITUS WITHOUT COMPLICATION, WITHOUT LONG-TERM CURRENT USE OF INSULIN (HCC): ICD-10-CM

## 2023-07-25 RX ORDER — SEMAGLUTIDE 1.34 MG/ML
0.5 INJECTION, SOLUTION SUBCUTANEOUS WEEKLY
Qty: 1 ADJUSTABLE DOSE PRE-FILLED PEN SYRINGE | Refills: 0 | Status: SHIPPED | OUTPATIENT
Start: 2023-07-25 | End: 2023-08-07

## 2023-07-25 NOTE — TELEPHONE ENCOUNTER
Refill Request       Last Seen: Last Seen Department: 5/8/2023  Last Seen by PCP: 5/8/2023    Last Written: 7/10/2023 1 with 3    Next Appointment:   Future Appointments   Date Time Provider [address]  [facility]   8/7/2023  9:00 AM Nicole Sharp DO [facility] AND [facility]             Requested Prescriptions     Pending Prescriptions Disp Refills    Semaglutide,0.25 or 0.5MG/DOS, (OZEMPIC, 0.25 OR 0.5 MG/DOSE,) 2 MG/1.5ML SOPN 1 Adjustable Dose Pre-filled Pen Syringe 3     Sig: Inject 0.5 mg into the skin once a week for 30 doses

## 2023-08-07 ENCOUNTER — OFFICE VISIT (OUTPATIENT)
Dept: PRIMARY CARE CLINIC | Age: 38
End: 2023-08-07
Payer: COMMERCIAL

## 2023-08-07 VITALS
TEMPERATURE: 97.9 F | OXYGEN SATURATION: 98 % | HEIGHT: 63 IN | HEART RATE: 82 BPM | WEIGHT: 187 LBS | BODY MASS INDEX: 33.13 KG/M2 | DIASTOLIC BLOOD PRESSURE: 64 MMHG | SYSTOLIC BLOOD PRESSURE: 100 MMHG

## 2023-08-07 DIAGNOSIS — E11.9 TYPE 2 DIABETES MELLITUS WITHOUT COMPLICATION, WITHOUT LONG-TERM CURRENT USE OF INSULIN (HCC): Primary | ICD-10-CM

## 2023-08-07 DIAGNOSIS — E66.9 OBESITY, CLASS I, BMI 30-34.9: ICD-10-CM

## 2023-08-07 LAB — HBA1C MFR BLD: 4.9 %

## 2023-08-07 PROCEDURE — 99214 OFFICE O/P EST MOD 30 MIN: CPT

## 2023-08-07 PROCEDURE — 83037 HB GLYCOSYLATED A1C HOME DEV: CPT

## 2023-08-07 PROCEDURE — 3044F HG A1C LEVEL LT 7.0%: CPT

## 2023-08-07 ASSESSMENT — PATIENT HEALTH QUESTIONNAIRE - PHQ9
3. TROUBLE FALLING OR STAYING ASLEEP: 1
7. TROUBLE CONCENTRATING ON THINGS, SUCH AS READING THE NEWSPAPER OR WATCHING TELEVISION: 0
SUM OF ALL RESPONSES TO PHQ9 QUESTIONS 1 & 2: 0
9. THOUGHTS THAT YOU WOULD BE BETTER OFF DEAD, OR OF HURTING YOURSELF: 0
1. LITTLE INTEREST OR PLEASURE IN DOING THINGS: 0
2. FEELING DOWN, DEPRESSED OR HOPELESS: 0
SUM OF ALL RESPONSES TO PHQ QUESTIONS 1-9: 2
5. POOR APPETITE OR OVEREATING: 1
SUM OF ALL RESPONSES TO PHQ QUESTIONS 1-9: 2
SUM OF ALL RESPONSES TO PHQ QUESTIONS 1-9: 2
6. FEELING BAD ABOUT YOURSELF - OR THAT YOU ARE A FAILURE OR HAVE LET YOURSELF OR YOUR FAMILY DOWN: 0
4. FEELING TIRED OR HAVING LITTLE ENERGY: 0
8. MOVING OR SPEAKING SO SLOWLY THAT OTHER PEOPLE COULD HAVE NOTICED. OR THE OPPOSITE, BEING SO FIGETY OR RESTLESS THAT YOU HAVE BEEN MOVING AROUND A LOT MORE THAN USUAL: 0
SUM OF ALL RESPONSES TO PHQ QUESTIONS 1-9: 2

## 2023-08-07 ASSESSMENT — ANXIETY QUESTIONNAIRES
3. WORRYING TOO MUCH ABOUT DIFFERENT THINGS: 0
GAD7 TOTAL SCORE: 0
5. BEING SO RESTLESS THAT IT IS HARD TO SIT STILL: 0
1. FEELING NERVOUS, ANXIOUS, OR ON EDGE: 0
7. FEELING AFRAID AS IF SOMETHING AWFUL MIGHT HAPPEN: 0
2. NOT BEING ABLE TO STOP OR CONTROL WORRYING: 0
6. BECOMING EASILY ANNOYED OR IRRITABLE: 0
4. TROUBLE RELAXING: 0

## 2023-09-04 DIAGNOSIS — E11.9 TYPE 2 DIABETES MELLITUS WITHOUT COMPLICATION, WITHOUT LONG-TERM CURRENT USE OF INSULIN (HCC): ICD-10-CM

## 2023-09-04 DIAGNOSIS — E66.9 OBESITY, CLASS I, BMI 30-34.9: ICD-10-CM

## 2023-09-05 NOTE — TELEPHONE ENCOUNTER
Refill Request     CONFIRM preferred pharmacy with the patient. If Mail Order Rx - Pend for 90 day refill. Last Seen: Last Seen Department: 8/7/2023  Last Seen by PCP: 8/7/2023    Last Written: 8/7/2023    If no future appointment scheduled:  Review the last OV with PCP and review information for follow-up visit,  Route STAFF MESSAGE with patient name to the [facility] for scheduling with the following information:            -  Timing of next visit           -  Visit type ie Physical, OV, etc           -  Diagnoses/Reason ie. COPD, HTN - Do not use MEDICATION, Follow-up or CHECK UP - Give reason for visit      Next Appointment:   Future Appointments   Date Time Provider [address]   11/10/2023 12:30 PM Mariela HALLMAN May, DO [address] [employer]       Message sent to [address] [facility] to schedule appt with patient?   N/A      Requested Prescriptions     Pending Prescriptions Disp Refills    Semaglutide, 1 MG/DOSE, 4 MG/3ML SOPN 3 mL 1     Sig: Inject 1 mg into the skin once a week

## 2023-09-12 ENCOUNTER — TELEPHONE (OUTPATIENT)
Dept: PRIMARY CARE CLINIC | Age: 38
End: 2023-09-12

## 2023-09-12 NOTE — TELEPHONE ENCOUNTER
Patients insurance is now requiring a prior auth for patients Semaglutide, 1 MG/DOSE, 4 MG/3ML SOPN  Please start a prior auth.     Please advise patient with any updates     Evelina Mahmood [phone] (home) [phone] (work)

## 2023-09-12 NOTE — TELEPHONE ENCOUNTER
SUBMITTED PA FOR Ozempic (1 MG/DOSE) 4MG/3ML pen-injectors VIA CMM Key: [id number]) STATUS NOT SENT. CALLED [facility] PER REPRESENTATIVE RENETTA DOMINGUEZ PATIENT DOES NOT HAVE PHARMACY COVERAGE. PATIENT ONLY HAS MEDICAL COVERAGE. FOLLOW UP DONE DAILY: IF NO RESPONSE IN 3 DAYS WE WILL REFAX FOR STATUS CHECK. IF ANOTHER 3 DAYS GOES BY WITH NO RESPONSE WILL CALL INSURANCE FOR STATUS. IF THIS REQUIRES A RESPONSE TO THE TEAM. PLEASE SEND TO THE POOL.

## 2023-09-18 NOTE — TELEPHONE ENCOUNTER
SUBMITTED PA FOR Ozempic (1 MG/DOSE) 4MG/3ML pen-injectors VIA CMM Key: [id number]) STATUS NOT SENT. CALLED [facility] PER REPRESENTATIVE RENETTA DOMINGUEZ PATIENT DOES NOT HAVE PHARMACY COVERAGE. PATIENT ONLY HAS MEDICAL COVERAGE. FOLLOW UP DONE DAILY: IF NO RESPONSE IN 3 DAYS WE WILL REFAX FOR STATUS CHECK. IF ANOTHER 3 DAYS GOES BY WITH NO RESPONSE WILL CALL INSURANCE FOR STATUS. IF THIS REQUIRES A RESPONSE TO THE TEAM. PLEASE SEND TO THE POOL.

## 2023-10-10 ENCOUNTER — TELEPHONE (OUTPATIENT)
Dept: PRIMARY CARE CLINIC | Age: 38
End: 2023-10-10

## 2023-10-10 DIAGNOSIS — E66.9 OBESITY, CLASS I, BMI 30-34.9: ICD-10-CM

## 2023-10-10 DIAGNOSIS — E11.9 TYPE 2 DIABETES MELLITUS WITHOUT COMPLICATION, WITHOUT LONG-TERM CURRENT USE OF INSULIN (HCC): ICD-10-CM

## 2023-10-10 NOTE — TELEPHONE ENCOUNTER
Refill Request       Last Seen: Last Seen Department: 8/7/2023  Last Seen by PCP: 5/8/2023    Last Written: 9/5/2023 3mL 1 refill    Next Appointment:   Future Appointments   Date Time Provider [address]   11/10/2023 12:30 PM May, Mariela HALLMAN DO [facility] AND [facility]             Requested Prescriptions     Pending Prescriptions Disp Refills    Semaglutide, 1 MG/DOSE, 4 MG/3ML SOPN 3 mL 1     Sig: Inject 1 mg into the skin once a week

## 2023-10-10 NOTE — TELEPHONE ENCOUNTER
Patient came in and requested a refill of Semaglutide, 1 MG/DOSE, 4 MG/3ML SOPN. Pt uses [facility]/pharmacy #[id number] [address] [place], [address] [place] [address]   Phone:  [phone]  Fax:  [phone]     If there are any questions please contact pt at [phone]     Pt's last visit 8/7/23 with Dr. Jens Plummer. The Patient insurance has been updated in the system.

## 2023-11-10 ENCOUNTER — OFFICE VISIT (OUTPATIENT)
Dept: PRIMARY CARE CLINIC | Age: 38
End: 2023-11-10

## 2023-11-10 VITALS
HEART RATE: 87 BPM | BODY MASS INDEX: 34.37 KG/M2 | OXYGEN SATURATION: 100 % | SYSTOLIC BLOOD PRESSURE: 112 MMHG | TEMPERATURE: 98.1 F | DIASTOLIC BLOOD PRESSURE: 68 MMHG | WEIGHT: 194 LBS

## 2023-11-10 DIAGNOSIS — E11.9 TYPE 2 DIABETES MELLITUS WITHOUT COMPLICATION, WITHOUT LONG-TERM CURRENT USE OF INSULIN (HCC): Primary | ICD-10-CM

## 2023-11-10 DIAGNOSIS — E66.9 OBESITY, CLASS I, BMI 30-34.9: ICD-10-CM

## 2023-11-10 RX ORDER — METFORMIN HYDROCHLORIDE 500 MG/1
TABLET, EXTENDED RELEASE ORAL
COMMUNITY
Start: 2023-10-28

## 2023-11-10 ASSESSMENT — PATIENT HEALTH QUESTIONNAIRE - PHQ9
SUM OF ALL RESPONSES TO PHQ QUESTIONS 1-9: 0
6. FEELING BAD ABOUT YOURSELF - OR THAT YOU ARE A FAILURE OR HAVE LET YOURSELF OR YOUR FAMILY DOWN: 0
2. FEELING DOWN, DEPRESSED OR HOPELESS: 0
8. MOVING OR SPEAKING SO SLOWLY THAT OTHER PEOPLE COULD HAVE NOTICED. OR THE OPPOSITE, BEING SO FIGETY OR RESTLESS THAT YOU HAVE BEEN MOVING AROUND A LOT MORE THAN USUAL: 0
7. TROUBLE CONCENTRATING ON THINGS, SUCH AS READING THE NEWSPAPER OR WATCHING TELEVISION: 0
SUM OF ALL RESPONSES TO PHQ QUESTIONS 1-9: 0
5. POOR APPETITE OR OVEREATING: 0
3. TROUBLE FALLING OR STAYING ASLEEP: 0
10. IF YOU CHECKED OFF ANY PROBLEMS, HOW DIFFICULT HAVE THESE PROBLEMS MADE IT FOR YOU TO DO YOUR WORK, TAKE CARE OF THINGS AT HOME, OR GET ALONG WITH OTHER PEOPLE: 0
SUM OF ALL RESPONSES TO PHQ9 QUESTIONS 1 & 2: 0
SUM OF ALL RESPONSES TO PHQ QUESTIONS 1-9: 0
SUM OF ALL RESPONSES TO PHQ QUESTIONS 1-9: 0
4. FEELING TIRED OR HAVING LITTLE ENERGY: 0
1. LITTLE INTEREST OR PLEASURE IN DOING THINGS: 0
9. THOUGHTS THAT YOU WOULD BE BETTER OFF DEAD, OR OF HURTING YOURSELF: 0

## 2023-11-10 ASSESSMENT — ANXIETY QUESTIONNAIRES
4. TROUBLE RELAXING: 0
GAD7 TOTAL SCORE: 0
1. FEELING NERVOUS, ANXIOUS, OR ON EDGE: 0
6. BECOMING EASILY ANNOYED OR IRRITABLE: 0
2. NOT BEING ABLE TO STOP OR CONTROL WORRYING: 0
3. WORRYING TOO MUCH ABOUT DIFFERENT THINGS: 0
IF YOU CHECKED OFF ANY PROBLEMS ON THIS QUESTIONNAIRE, HOW DIFFICULT HAVE THESE PROBLEMS MADE IT FOR YOU TO DO YOUR WORK, TAKE CARE OF THINGS AT HOME, OR GET ALONG WITH OTHER PEOPLE: NOT DIFFICULT AT ALL
5. BEING SO RESTLESS THAT IT IS HARD TO SIT STILL: 0
7. FEELING AFRAID AS IF SOMETHING AWFUL MIGHT HAPPEN: 0

## 2023-11-10 NOTE — PROGRESS NOTES
Patient was seen and evaluated with the resident physician on the date of service. I agree with plan of care as documented below.      Tez Ask, DO

## 2023-11-13 ENCOUNTER — TELEPHONE (OUTPATIENT)
Dept: ADMINISTRATIVE | Age: 38
End: 2023-11-13

## 2023-11-13 DIAGNOSIS — E11.9 TYPE 2 DIABETES MELLITUS WITHOUT COMPLICATION, WITHOUT LONG-TERM CURRENT USE OF INSULIN (HCC): Primary | ICD-10-CM

## 2023-11-13 NOTE — TELEPHONE ENCOUNTER
SUBMITTED PA FOR Ozempic (0.25 or 0.5 MG/DOSE) 2MG/3ML pen-injectors VIA CMM Key: [id number] STATUS PENDING. FOLLOW UP DONE DAILY: IF NO RESPONSE IN 3 DAYS WE WILL REFAX FOR STATUS CHECK. IF ANOTHER 3 DAYS GOES BY WITH NO RESPONSE WILL CALL INSURANCE FOR STATUS.

## 2023-11-14 NOTE — TELEPHONE ENCOUNTER
The medication is APPROVED. LETTER AVAILABLE IN MEDIA. Thank You! If this requires a response please respond to the pool ( [phone] [phone]). Thank you please advise patient.

## 2023-11-15 NOTE — TELEPHONE ENCOUNTER
Mounjaro and trulicity patient states that you and the attending you were with said something about those 2 medciations [facility] and ozempic is on back order so patient wanted to know if maybe she could try one of them

## 2023-11-15 NOTE — TELEPHONE ENCOUNTER
Called patient she called pharmacy yesterday they said was still waiting on insurance approval told her it was approved yesterday she is going to call the pharmacy and see what they say and give me a call back

## 2023-11-16 RX ORDER — TIRZEPATIDE 2.5 MG/.5ML
2.5 INJECTION, SOLUTION SUBCUTANEOUS WEEKLY
Qty: 4 EACH | Refills: 0 | Status: SHIPPED | OUTPATIENT
Start: 2023-11-16

## 2023-11-16 NOTE — TELEPHONE ENCOUNTER
Called and spoke with this patient. She has been advised per Dr. Dary Stoner and will call us back and let us know if she is able to find the [address] [place] [address] Avenue at a local pharmacy.

## 2023-11-27 NOTE — ASSESSMENT & PLAN NOTE
Uncontrolled, continue current medications and lifestyle modifications recommended   - Likely will improve with weight loss   - not on statin due to reproductive age and age <40  - Triglycerides >500, increased risk of pancreatitis. Recommend fasting lipid panel in 2 mo.    - f/u 2 mo hide

## 2023-12-01 NOTE — TELEPHONE ENCOUNTER
Refill Request   Return in about 3 months (around 2/10/2024) for DMTII.     Last Seen: Last Seen Department: 11/10/2023  Last Seen by PCP: 11/10/2023    Last Written: 11/10/23 1 with 0    Next Appointment:   No future appointments.    Left message on the patient's/parent's/guardian's voicemail asking for return call to schedule appointment       Requested Prescriptions     Pending Prescriptions Disp Refills    Semaglutide,0.25 or 0.5MG/DOS, 2 MG/1.5ML SOPN 1 Adjustable Dose Pre-filled Pen Syringe 0     Sig: Inject 0.25 mg into the skin once a week

## 2024-01-11 DIAGNOSIS — E66.9 OBESITY, CLASS I, BMI 30-34.9: ICD-10-CM

## 2024-01-11 DIAGNOSIS — E11.9 TYPE 2 DIABETES MELLITUS WITHOUT COMPLICATION, WITHOUT LONG-TERM CURRENT USE OF INSULIN (HCC): ICD-10-CM

## 2024-02-10 DIAGNOSIS — E66.9 OBESITY, CLASS I, BMI 30-34.9: ICD-10-CM

## 2024-02-10 DIAGNOSIS — E11.9 TYPE 2 DIABETES MELLITUS WITHOUT COMPLICATION, WITHOUT LONG-TERM CURRENT USE OF INSULIN (HCC): ICD-10-CM

## 2024-02-12 NOTE — TELEPHONE ENCOUNTER
Refill Request       Last Seen: Last Seen Department: 11/10/2023  Last Seen by PCP: 11/10/2023    Last Written: 01/15/24 qty 3 mL w/ 1    Next Appointment:   No future appointments.    Message to  to schedule appointment.         Requested Prescriptions     Pending Prescriptions Disp Refills    Semaglutide, 1 MG/DOSE, 4 MG/3ML SOPN 3 mL 1     Sig: Inject 1 mg into the skin once a week

## 2024-04-07 DIAGNOSIS — E11.9 TYPE 2 DIABETES MELLITUS WITHOUT COMPLICATION, WITHOUT LONG-TERM CURRENT USE OF INSULIN (HCC): ICD-10-CM

## 2024-04-07 DIAGNOSIS — E66.9 OBESITY, CLASS I, BMI 30-34.9: ICD-10-CM

## 2024-04-08 NOTE — TELEPHONE ENCOUNTER
Refill Request     CONFIRM preferred pharmacy with the patient.    If Mail Order Rx - Pend for 90 day refill.      Last Seen: Last Seen Department: 11/10/2023  Last Seen by PCP: 11/10/2023    Last Written: 2/12/24    If no future appointment scheduled:  Review the last OV with PCP and review information for follow-up visit,  Route STAFF MESSAGE with patient name to the  Pool for scheduling with the following information:            -  Timing of next visit           -  Visit type ie Physical, OV, etc           -  Diagnoses/Reason ie. COPD, HTN - Do not use MEDICATION, Follow-up or CHECK UP - Give reason for visit      Next Appointment:   No future appointments.    Message sent to  to schedule appt with patient?  YES      Requested Prescriptions     Pending Prescriptions Disp Refills    Semaglutide, 1 MG/DOSE, 4 MG/3ML SOPN 3 mL 1     Sig: Inject 1 mg into the skin once a week

## 2024-05-08 NOTE — TELEPHONE ENCOUNTER
Refill Request   semaglutide, 2 MG/DOSE, (OZEMPIC) 8 MG/3ML SOPN sc injection     Last Seen: Last Seen Department: 11/10/2023  Last Seen by PCP: 11/10/2023    Last Written: 04/15/24    Next Appointment:   No future appointments.      Requested Prescriptions      No prescriptions requested or ordered in this encounter      semaglutide, 2 MG/DOSE, (OZEMPIC) 8 MG/3ML SOPN sc injection [[id number]]    Order Details  Dose: 2 mg Route: SubCUTAneous Frequency: EVERY 7 DAYS   Dispense Quantity: 3 mL Refills: 0          Sig: Inject 2 mg into the skin every 7 days         Start Date: 04/15/24 End Date: --   Written Date: 04/15/24 Expiration Date: 04/15/25   Providers    Authorizing Provider: Tg Sandra DO  NPI: [id number]   Supervising Provider: Tg Butts MD NPI: [id number]   Ordering User: Tg Sandra DO          Pharmacy    [facility]/pharmacy #[id number] [address] - [place] [phone] - F [phone]

## 2024-06-24 RX ORDER — SEMAGLUTIDE 2.68 MG/ML
INJECTION, SOLUTION SUBCUTANEOUS
Qty: 3 ML | Refills: 0 | Status: SHIPPED | OUTPATIENT
Start: 2024-06-24

## 2024-06-24 NOTE — TELEPHONE ENCOUNTER
Refill Request       Last Seen: Last Seen Department: 11/10/2023  Last Seen by PCP: 8/7/2023    Last Written: 5/9/24 3 mL with 0    Next Appointment:   No future appointments.    Requested Prescriptions     Pending Prescriptions Disp Refills    OZEMPIC, 2 MG/DOSE, 8 MG/3ML SOPN sc injection [Pharmacy Med Name: OZEMPIC 8 MG/3 ML (2 MG/DOSE)]       Sig: INJECT 2 MG INTO THE SKIN EVERY 7 DAYS